# Patient Record
Sex: MALE | Race: BLACK OR AFRICAN AMERICAN | Employment: FULL TIME | URBAN - METROPOLITAN AREA
[De-identification: names, ages, dates, MRNs, and addresses within clinical notes are randomized per-mention and may not be internally consistent; named-entity substitution may affect disease eponyms.]

---

## 2018-03-15 ENCOUNTER — APPOINTMENT (OUTPATIENT)
Dept: GENERAL RADIOLOGY | Age: 58
DRG: 194 | End: 2018-03-15
Attending: PHYSICIAN ASSISTANT
Payer: COMMERCIAL

## 2018-03-15 ENCOUNTER — HOSPITAL ENCOUNTER (INPATIENT)
Age: 58
LOS: 2 days | Discharge: HOME | DRG: 194 | End: 2018-03-17
Attending: EMERGENCY MEDICINE | Admitting: INTERNAL MEDICINE
Payer: COMMERCIAL

## 2018-03-15 ENCOUNTER — APPOINTMENT (OUTPATIENT)
Dept: CT IMAGING | Age: 58
DRG: 194 | End: 2018-03-15
Attending: PHYSICIAN ASSISTANT
Payer: COMMERCIAL

## 2018-03-15 DIAGNOSIS — R42 VERTIGO: ICD-10-CM

## 2018-03-15 DIAGNOSIS — R09.02 HYPOXIA: ICD-10-CM

## 2018-03-15 DIAGNOSIS — J18.9 PNEUMONIA OF LEFT LOWER LOBE DUE TO INFECTIOUS ORGANISM: Primary | ICD-10-CM

## 2018-03-15 PROBLEM — Z78.9 ALCOHOL USE: Status: ACTIVE | Noted: 2018-03-15

## 2018-03-15 PROBLEM — R06.03 ACUTE RESPIRATORY DISTRESS: Status: ACTIVE | Noted: 2018-03-15

## 2018-03-15 PROBLEM — Z72.0 TOBACCO USE: Status: ACTIVE | Noted: 2018-03-15

## 2018-03-15 PROBLEM — M62.82 RHABDOMYOLYSIS: Status: ACTIVE | Noted: 2018-03-15

## 2018-03-15 LAB
ALBUMIN SERPL-MCNC: 3.9 G/DL (ref 3.4–5)
ALBUMIN/GLOB SERPL: 1.1 {RATIO} (ref 0.8–1.7)
ALP SERPL-CCNC: 87 U/L (ref 45–117)
ALT SERPL-CCNC: 33 U/L (ref 16–61)
AMPHET UR QL SCN: NEGATIVE
ANION GAP SERPL CALC-SCNC: 14 MMOL/L (ref 3–18)
APPEARANCE UR: CLEAR
AST SERPL-CCNC: 63 U/L (ref 15–37)
BARBITURATES UR QL SCN: NEGATIVE
BASOPHILS # BLD: 0 K/UL (ref 0–0.1)
BASOPHILS NFR BLD: 0 % (ref 0–3)
BENZODIAZ UR QL: NEGATIVE
BILIRUB SERPL-MCNC: 0.4 MG/DL (ref 0.2–1)
BILIRUB UR QL: NEGATIVE
BUN SERPL-MCNC: 7 MG/DL (ref 7–18)
BUN/CREAT SERPL: 6 (ref 12–20)
CALCIUM SERPL-MCNC: 8.5 MG/DL (ref 8.5–10.1)
CANNABINOIDS UR QL SCN: NEGATIVE
CHLORIDE SERPL-SCNC: 97 MMOL/L (ref 100–108)
CK MB CFR SERPL CALC: 0.1 % (ref 0–4)
CK MB SERPL-MCNC: 1.3 NG/ML (ref 5–25)
CK SERPL-CCNC: 1496 U/L (ref 39–308)
CO2 SERPL-SCNC: 24 MMOL/L (ref 21–32)
COCAINE UR QL SCN: NEGATIVE
COLOR UR: YELLOW
CREAT SERPL-MCNC: 1.12 MG/DL (ref 0.6–1.3)
D DIMER PPP FEU-MCNC: 0.96 UG/ML(FEU)
DIFFERENTIAL METHOD BLD: ABNORMAL
EOSINOPHIL # BLD: 0 K/UL (ref 0–0.4)
EOSINOPHIL NFR BLD: 0 % (ref 0–5)
ERYTHROCYTE [DISTWIDTH] IN BLOOD BY AUTOMATED COUNT: 13.4 % (ref 11.6–14.5)
GLOBULIN SER CALC-MCNC: 3.4 G/DL (ref 2–4)
GLUCOSE SERPL-MCNC: 192 MG/DL (ref 74–99)
GLUCOSE UR STRIP.AUTO-MCNC: >1000 MG/DL
HCT VFR BLD AUTO: 39.5 % (ref 36–48)
HDSCOM,HDSCOM: NORMAL
HGB BLD-MCNC: 13.1 G/DL (ref 13–16)
HGB UR QL STRIP: NEGATIVE
KETONES UR QL STRIP.AUTO: 40 MG/DL
LACTATE SERPL-SCNC: 1.7 MMOL/L (ref 0.4–2)
LEUKOCYTE ESTERASE UR QL STRIP.AUTO: NEGATIVE
LIPASE SERPL-CCNC: 117 U/L (ref 73–393)
LYMPHOCYTES # BLD: 0.5 K/UL (ref 0.8–3.5)
LYMPHOCYTES NFR BLD: 7 % (ref 20–51)
MCH RBC QN AUTO: 28.6 PG (ref 24–34)
MCHC RBC AUTO-ENTMCNC: 33.2 G/DL (ref 31–37)
MCV RBC AUTO: 86.2 FL (ref 74–97)
METHADONE UR QL: NEGATIVE
MONOCYTES # BLD: 0.3 K/UL (ref 0–1)
MONOCYTES NFR BLD: 4 % (ref 2–9)
NEUTS BAND NFR BLD MANUAL: 19 % (ref 0–5)
NEUTS SEG # BLD: 4.9 K/UL (ref 1.8–8)
NEUTS SEG NFR BLD: 70 % (ref 42–75)
NITRITE UR QL STRIP.AUTO: NEGATIVE
OPIATES UR QL: NEGATIVE
PCP UR QL: NEGATIVE
PH UR STRIP: 5.5 [PH] (ref 5–8)
PLATELET # BLD AUTO: 121 K/UL (ref 135–420)
PMV BLD AUTO: 10.4 FL (ref 9.2–11.8)
POTASSIUM SERPL-SCNC: 3.7 MMOL/L (ref 3.5–5.5)
PROT SERPL-MCNC: 7.3 G/DL (ref 6.4–8.2)
PROT UR STRIP-MCNC: NEGATIVE MG/DL
RBC # BLD AUTO: 4.58 M/UL (ref 4.7–5.5)
RBC MORPH BLD: ABNORMAL
SODIUM SERPL-SCNC: 135 MMOL/L (ref 136–145)
SP GR UR REFRACTOMETRY: 1.03 (ref 1–1.03)
TROPONIN I SERPL-MCNC: <0.02 NG/ML (ref 0–0.06)
UROBILINOGEN UR QL STRIP.AUTO: 1 EU/DL (ref 0.2–1)
WBC # BLD AUTO: 7 K/UL (ref 4.6–13.2)

## 2018-03-15 PROCEDURE — 80053 COMPREHEN METABOLIC PANEL: CPT | Performed by: PHYSICIAN ASSISTANT

## 2018-03-15 PROCEDURE — 77030027138 HC INCENT SPIROMETER -A

## 2018-03-15 PROCEDURE — 74011000250 HC RX REV CODE- 250: Performed by: PHYSICIAN ASSISTANT

## 2018-03-15 PROCEDURE — 65270000029 HC RM PRIVATE

## 2018-03-15 PROCEDURE — 94640 AIRWAY INHALATION TREATMENT: CPT

## 2018-03-15 PROCEDURE — 70450 CT HEAD/BRAIN W/O DYE: CPT

## 2018-03-15 PROCEDURE — 83690 ASSAY OF LIPASE: CPT | Performed by: PHYSICIAN ASSISTANT

## 2018-03-15 PROCEDURE — 93005 ELECTROCARDIOGRAM TRACING: CPT

## 2018-03-15 PROCEDURE — 96375 TX/PRO/DX INJ NEW DRUG ADDON: CPT

## 2018-03-15 PROCEDURE — 74011250636 HC RX REV CODE- 250/636: Performed by: INTERNAL MEDICINE

## 2018-03-15 PROCEDURE — 74011636320 HC RX REV CODE- 636/320: Performed by: EMERGENCY MEDICINE

## 2018-03-15 PROCEDURE — 74011000250 HC RX REV CODE- 250: Performed by: INTERNAL MEDICINE

## 2018-03-15 PROCEDURE — 96361 HYDRATE IV INFUSION ADD-ON: CPT

## 2018-03-15 PROCEDURE — 96365 THER/PROPH/DIAG IV INF INIT: CPT

## 2018-03-15 PROCEDURE — 74011250636 HC RX REV CODE- 250/636: Performed by: EMERGENCY MEDICINE

## 2018-03-15 PROCEDURE — 85379 FIBRIN DEGRADATION QUANT: CPT | Performed by: PHYSICIAN ASSISTANT

## 2018-03-15 PROCEDURE — 83605 ASSAY OF LACTIC ACID: CPT | Performed by: PHYSICIAN ASSISTANT

## 2018-03-15 PROCEDURE — 96376 TX/PRO/DX INJ SAME DRUG ADON: CPT

## 2018-03-15 PROCEDURE — 94760 N-INVAS EAR/PLS OXIMETRY 1: CPT

## 2018-03-15 PROCEDURE — 74011250636 HC RX REV CODE- 250/636: Performed by: PHYSICIAN ASSISTANT

## 2018-03-15 PROCEDURE — 74011250637 HC RX REV CODE- 250/637: Performed by: INTERNAL MEDICINE

## 2018-03-15 PROCEDURE — 87040 BLOOD CULTURE FOR BACTERIA: CPT | Performed by: PHYSICIAN ASSISTANT

## 2018-03-15 PROCEDURE — 81003 URINALYSIS AUTO W/O SCOPE: CPT | Performed by: PHYSICIAN ASSISTANT

## 2018-03-15 PROCEDURE — 80307 DRUG TEST PRSMV CHEM ANLYZR: CPT | Performed by: PHYSICIAN ASSISTANT

## 2018-03-15 PROCEDURE — 99285 EMERGENCY DEPT VISIT HI MDM: CPT

## 2018-03-15 PROCEDURE — 77030013140 HC MSK NEB VYRM -A

## 2018-03-15 PROCEDURE — 71045 X-RAY EXAM CHEST 1 VIEW: CPT

## 2018-03-15 PROCEDURE — 85025 COMPLETE CBC W/AUTO DIFF WBC: CPT | Performed by: PHYSICIAN ASSISTANT

## 2018-03-15 PROCEDURE — 71275 CT ANGIOGRAPHY CHEST: CPT

## 2018-03-15 PROCEDURE — 82550 ASSAY OF CK (CPK): CPT | Performed by: PHYSICIAN ASSISTANT

## 2018-03-15 RX ORDER — MECLIZINE HCL 12.5 MG 12.5 MG/1
50 TABLET ORAL
Status: COMPLETED | OUTPATIENT
Start: 2018-03-15 | End: 2018-03-15

## 2018-03-15 RX ORDER — LORAZEPAM 1 MG/1
1 TABLET ORAL
Status: DISCONTINUED | OUTPATIENT
Start: 2018-03-15 | End: 2018-03-18 | Stop reason: HOSPADM

## 2018-03-15 RX ORDER — DIAZEPAM 10 MG/2ML
5 INJECTION INTRAMUSCULAR
Status: DISCONTINUED | OUTPATIENT
Start: 2018-03-15 | End: 2018-03-15

## 2018-03-15 RX ORDER — ONDANSETRON 2 MG/ML
4 INJECTION INTRAMUSCULAR; INTRAVENOUS
Status: COMPLETED | OUTPATIENT
Start: 2018-03-15 | End: 2018-03-15

## 2018-03-15 RX ORDER — SODIUM CHLORIDE 0.9 % (FLUSH) 0.9 %
5-10 SYRINGE (ML) INJECTION AS NEEDED
Status: DISCONTINUED | OUTPATIENT
Start: 2018-03-15 | End: 2018-03-18 | Stop reason: HOSPADM

## 2018-03-15 RX ORDER — ACETAMINOPHEN 325 MG/1
650 TABLET ORAL
Status: DISCONTINUED | OUTPATIENT
Start: 2018-03-15 | End: 2018-03-18 | Stop reason: HOSPADM

## 2018-03-15 RX ORDER — ONDANSETRON 2 MG/ML
4 INJECTION INTRAMUSCULAR; INTRAVENOUS
Status: DISCONTINUED | OUTPATIENT
Start: 2018-03-15 | End: 2018-03-18 | Stop reason: HOSPADM

## 2018-03-15 RX ORDER — LORAZEPAM 2 MG/ML
3 INJECTION INTRAMUSCULAR
Status: DISCONTINUED | OUTPATIENT
Start: 2018-03-15 | End: 2018-03-18 | Stop reason: HOSPADM

## 2018-03-15 RX ORDER — MECLIZINE HCL 12.5 MG 12.5 MG/1
37.5 TABLET ORAL
Status: ACTIVE | OUTPATIENT
Start: 2018-03-15 | End: 2018-03-16

## 2018-03-15 RX ORDER — IPRATROPIUM BROMIDE AND ALBUTEROL SULFATE 2.5; .5 MG/3ML; MG/3ML
3 SOLUTION RESPIRATORY (INHALATION)
Status: COMPLETED | OUTPATIENT
Start: 2018-03-15 | End: 2018-03-15

## 2018-03-15 RX ORDER — ALBUTEROL SULFATE 2.5 MG/.5ML
2.5 SOLUTION RESPIRATORY (INHALATION)
Status: DISPENSED | OUTPATIENT
Start: 2018-03-15 | End: 2018-03-16

## 2018-03-15 RX ORDER — NICOTINE 7MG/24HR
1 PATCH, TRANSDERMAL 24 HOURS TRANSDERMAL EVERY 24 HOURS
Status: DISCONTINUED | OUTPATIENT
Start: 2018-03-15 | End: 2018-03-18 | Stop reason: HOSPADM

## 2018-03-15 RX ORDER — ACETAMINOPHEN 325 MG/1
650 TABLET ORAL
Status: COMPLETED | OUTPATIENT
Start: 2018-03-15 | End: 2018-03-15

## 2018-03-15 RX ORDER — SODIUM CHLORIDE 0.9 % (FLUSH) 0.9 %
5-10 SYRINGE (ML) INJECTION EVERY 8 HOURS
Status: DISCONTINUED | OUTPATIENT
Start: 2018-03-15 | End: 2018-03-18 | Stop reason: HOSPADM

## 2018-03-15 RX ORDER — NALOXONE HYDROCHLORIDE 0.4 MG/ML
0.4 INJECTION, SOLUTION INTRAMUSCULAR; INTRAVENOUS; SUBCUTANEOUS AS NEEDED
Status: DISCONTINUED | OUTPATIENT
Start: 2018-03-15 | End: 2018-03-18 | Stop reason: HOSPADM

## 2018-03-15 RX ORDER — LORAZEPAM 2 MG/ML
2 INJECTION INTRAMUSCULAR
Status: DISCONTINUED | OUTPATIENT
Start: 2018-03-15 | End: 2018-03-18 | Stop reason: HOSPADM

## 2018-03-15 RX ORDER — LORAZEPAM 1 MG/1
2 TABLET ORAL
Status: DISCONTINUED | OUTPATIENT
Start: 2018-03-15 | End: 2018-03-18 | Stop reason: HOSPADM

## 2018-03-15 RX ORDER — LORAZEPAM 2 MG/ML
1 INJECTION INTRAMUSCULAR
Status: DISCONTINUED | OUTPATIENT
Start: 2018-03-15 | End: 2018-03-18 | Stop reason: HOSPADM

## 2018-03-15 RX ORDER — VANCOMYCIN HYDROCHLORIDE 1 G/20ML
1 INJECTION, POWDER, LYOPHILIZED, FOR SOLUTION INTRAVENOUS ONCE
Status: DISCONTINUED | OUTPATIENT
Start: 2018-03-15 | End: 2018-03-15 | Stop reason: SDUPTHER

## 2018-03-15 RX ORDER — HEPARIN SODIUM 5000 [USP'U]/ML
5000 INJECTION, SOLUTION INTRAVENOUS; SUBCUTANEOUS EVERY 8 HOURS
Status: DISCONTINUED | OUTPATIENT
Start: 2018-03-15 | End: 2018-03-18 | Stop reason: HOSPADM

## 2018-03-15 RX ORDER — LEVOFLOXACIN 5 MG/ML
750 INJECTION, SOLUTION INTRAVENOUS
Status: COMPLETED | OUTPATIENT
Start: 2018-03-15 | End: 2018-03-15

## 2018-03-15 RX ORDER — ALBUTEROL SULFATE 2.5 MG/.5ML
5 SOLUTION RESPIRATORY (INHALATION)
Status: COMPLETED | OUTPATIENT
Start: 2018-03-15 | End: 2018-03-15

## 2018-03-15 RX ORDER — LEVOFLOXACIN 5 MG/ML
750 INJECTION, SOLUTION INTRAVENOUS EVERY 24 HOURS
Status: DISCONTINUED | OUTPATIENT
Start: 2018-03-16 | End: 2018-03-16

## 2018-03-15 RX ORDER — MECLIZINE HCL 12.5 MG 12.5 MG/1
50 TABLET ORAL
Status: ACTIVE | OUTPATIENT
Start: 2018-03-15 | End: 2018-03-16

## 2018-03-15 RX ORDER — DOCUSATE SODIUM 100 MG/1
100 CAPSULE, LIQUID FILLED ORAL
Status: DISCONTINUED | OUTPATIENT
Start: 2018-03-15 | End: 2018-03-18 | Stop reason: HOSPADM

## 2018-03-15 RX ORDER — SODIUM CHLORIDE 9 MG/ML
100 INJECTION, SOLUTION INTRAVENOUS CONTINUOUS
Status: DISCONTINUED | OUTPATIENT
Start: 2018-03-15 | End: 2018-03-18 | Stop reason: HOSPADM

## 2018-03-15 RX ORDER — IPRATROPIUM BROMIDE AND ALBUTEROL SULFATE 2.5; .5 MG/3ML; MG/3ML
3 SOLUTION RESPIRATORY (INHALATION)
Status: DISCONTINUED | OUTPATIENT
Start: 2018-03-15 | End: 2018-03-16

## 2018-03-15 RX ORDER — GUAIFENESIN 600 MG/1
600 TABLET, EXTENDED RELEASE ORAL
Status: DISCONTINUED | OUTPATIENT
Start: 2018-03-15 | End: 2018-03-18 | Stop reason: HOSPADM

## 2018-03-15 RX ADMIN — FOLIC ACID: 5 INJECTION, SOLUTION INTRAMUSCULAR; INTRAVENOUS; SUBCUTANEOUS at 22:22

## 2018-03-15 RX ADMIN — IPRATROPIUM BROMIDE AND ALBUTEROL SULFATE 3 ML: .5; 3 SOLUTION RESPIRATORY (INHALATION) at 19:26

## 2018-03-15 RX ADMIN — SODIUM CHLORIDE 1000 MG: 900 INJECTION, SOLUTION INTRAVENOUS at 22:40

## 2018-03-15 RX ADMIN — METHYLPREDNISOLONE SODIUM SUCCINATE 125 MG: 125 INJECTION, POWDER, FOR SOLUTION INTRAMUSCULAR; INTRAVENOUS at 15:58

## 2018-03-15 RX ADMIN — Medication 10 ML: at 22:23

## 2018-03-15 RX ADMIN — IPRATROPIUM BROMIDE AND ALBUTEROL SULFATE 3 ML: .5; 3 SOLUTION RESPIRATORY (INHALATION) at 21:54

## 2018-03-15 RX ADMIN — SODIUM CHLORIDE 1000 ML: 900 INJECTION, SOLUTION INTRAVENOUS at 11:55

## 2018-03-15 RX ADMIN — SODIUM CHLORIDE 100 ML/HR: 900 INJECTION, SOLUTION INTRAVENOUS at 22:23

## 2018-03-15 RX ADMIN — METHYLPREDNISOLONE SODIUM SUCCINATE 40 MG: 40 INJECTION, POWDER, FOR SOLUTION INTRAMUSCULAR; INTRAVENOUS at 22:23

## 2018-03-15 RX ADMIN — IOPAMIDOL 100 ML: 755 INJECTION, SOLUTION INTRAVENOUS at 17:36

## 2018-03-15 RX ADMIN — MECLIZINE 50 MG: 12.5 TABLET ORAL at 14:36

## 2018-03-15 RX ADMIN — ONDANSETRON 4 MG: 2 INJECTION INTRAMUSCULAR; INTRAVENOUS at 15:16

## 2018-03-15 RX ADMIN — ONDANSETRON 4 MG: 2 INJECTION INTRAMUSCULAR; INTRAVENOUS at 12:04

## 2018-03-15 RX ADMIN — HEPARIN SODIUM 5000 UNITS: 5000 INJECTION, SOLUTION INTRAVENOUS; SUBCUTANEOUS at 22:24

## 2018-03-15 RX ADMIN — MECLIZINE 50 MG: 12.5 TABLET ORAL at 15:48

## 2018-03-15 RX ADMIN — LEVOFLOXACIN 750 MG: 5 INJECTION, SOLUTION INTRAVENOUS at 19:27

## 2018-03-15 RX ADMIN — IPRATROPIUM BROMIDE AND ALBUTEROL SULFATE 3 ML: .5; 3 SOLUTION RESPIRATORY (INHALATION) at 14:30

## 2018-03-15 RX ADMIN — ACETAMINOPHEN 650 MG: 325 TABLET, FILM COATED ORAL at 20:47

## 2018-03-15 RX ADMIN — SODIUM CHLORIDE 1000 ML: 900 INJECTION, SOLUTION INTRAVENOUS at 14:36

## 2018-03-15 RX ADMIN — ALBUTEROL SULFATE 5 MG: 2.5 SOLUTION RESPIRATORY (INHALATION) at 15:51

## 2018-03-15 NOTE — IP AVS SNAPSHOT
[address] [address] 
[phone] Patient: Willard Pickens MRN: [id number] IBC:[id number] A check drew indicates which time of day the medication should be taken. My Medications START taking these medications Instructions Each Dose to Equal  
 Morning Noon Evening Bedtime  
 albuterol 90 mcg/actuation inhaler Commonly known as:  PROVENTIL HFA, VENTOLIN HFA, PROAIR HFA Your last dose was: Your next dose is: Take 1 Puff by inhalation every six (6) hours as needed for Wheezing for up to 30 days. 1 Puff  
    
   
   
   
  
 levoFLOXacin 750 mg tablet Commonly known as:  Hiro Pope Your last dose was: Your next dose is: Take 1 Tab by mouth daily for 5 doses. 750 mg  
    
   
   
   
  
 predniSONE 20 mg tablet Commonly known as:  Pop Alfaro Start taking on:  3/18/2018 Your last dose was: Your next dose is: Take 2 Tabs by mouth daily (with breakfast) for 4 days. 40 mg Where to Get Your Medications Information on where to get these meds will be given to you by the nurse or doctor. ! Ask your nurse or doctor about these medications  
  albuterol 90 mcg/actuation inhaler  
 levoFLOXacin 750 mg tablet  
 predniSONE 20 mg tablet

## 2018-03-15 NOTE — ED PROVIDER NOTES
EMERGENCY DEPARTMENT HISTORY AND PHYSICAL EXAM    Date: 3/15/2018  Patient Name: Tiffanie Srinivasan    History of Presenting Illness     Chief Complaint   Patient presents with    Dizziness    Cough         History Provided By: Patient    Chief Complaint: cough  Duration: 1 Days  Timing:  Gradual  Location: chest  Severity: Moderate  Associated Symptoms: chills, CP secondary to cough, congestion, dizziness, vomiting, diarrhea and SOB    Additional History (Context):   11:20 AM  Tiffanie Srinivasan is a 56 y.o. male who presents to the emergency department C/O productive cough with with phlegm onset yesterday. Associated sxs include chills, CP secondary to cough, congestion, dizziness, vomiting, diarrhea and SOB. Pt smoked one pack of cigarettes daily. Pt drinks 6 pack beer alcohol daily. Pt has been around family with similar sxs who were diagnosed with bronchitis and flu. Pt denies any other sxs or complaints. PCP: Rafi Sanford MD        Past History     Past Medical History:  History reviewed. No pertinent past medical history. Past Surgical History:  History reviewed. No pertinent surgical history. Family History:  History reviewed. No pertinent family history. Social History:  Social History   Substance Use Topics    Smoking status: Former Smoker     Packs/day: 0.50     Quit date: 1938    Smokeless tobacco: Never Used    Alcohol use 25.2 oz/week     42 Cans of beer per week      Comment: drinks a 6 pack per day        Allergies:  No Known Allergies      Review of Systems   Review of Systems   Constitutional: Positive for chills. HENT: Positive for congestion. Respiratory: Positive for cough and shortness of breath. Cardiovascular: Positive for chest pain. Gastrointestinal: Positive for diarrhea and vomiting. Neurological: Positive for dizziness. All other systems reviewed and are negative.       Physical Exam     Vitals:    03/15/18 1845 03/15/18 1900 03/15/18 1915 03/15/18 1930   BP: 133/80 120/89 129/72 117/73   Pulse: (!) 101 99 96 (!) 105   Resp: 25 26 26 17   Temp:       SpO2: 95% 95% 91% 99%   Weight:       Height:         Physical Exam   Constitutional: He is oriented to person, place, and time. Thin male slightly pale appearing reclined on stretcher with eyes closed, appears comfortable & in NAD   HENT:   Head: Normocephalic and atraumatic. Eyes: Conjunctivae and EOM are normal. Pupils are equal, round, and reactive to light. Neck: Normal range of motion. Neck supple. Cardiovascular: Normal rate and regular rhythm. Pulmonary/Chest: Effort normal. He has wheezes. Abdominal: Soft. Bowel sounds are normal. He exhibits no distension. There is no tenderness. There is no rebound and no guarding. Musculoskeletal: Normal range of motion. He exhibits no edema or tenderness. Neurological: He is alert and oriented to person, place, and time. He has normal strength. No cranial nerve deficit or sensory deficit. Gait normal. GCS eye subscore is 4. GCS verbal subscore is 5. GCS motor subscore is 6. Skin: Skin is warm and dry. Psychiatric: He has a normal mood and affect. Nursing note and vitals reviewed.         Diagnostic Study Results     Labs -     Recent Results (from the past 12 hour(s))   EKG, 12 LEAD, INITIAL    Collection Time: 03/15/18 11:06 AM   Result Value Ref Range    Ventricular Rate 82 BPM    Atrial Rate 82 BPM    P-R Interval 124 ms    QRS Duration 88 ms    Q-T Interval 376 ms    QTC Calculation (Bezet) 439 ms    Calculated P Axis 84 degrees    Calculated R Axis -64 degrees    Calculated T Axis 75 degrees    Diagnosis       Normal sinus rhythm with sinus arrhythmia  Right atrial enlargement  Pulmonary disease pattern  Left anterior fascicular block  Abnormal ECG  No previous ECGs available     CBC WITH AUTOMATED DIFF    Collection Time: 03/15/18 11:12 AM   Result Value Ref Range    WBC 7.0 4.6 - 13.2 K/uL    RBC 4.58 (L) 4.70 - 5.50 M/uL    HGB 13.1 13.0 - 16.0 g/dL    HCT 39.5 36.0 - 48.0 %    MCV 86.2 74.0 - 97.0 FL    MCH 28.6 24.0 - 34.0 PG    MCHC 33.2 31.0 - 37.0 g/dL    RDW 13.4 11.6 - 14.5 %    PLATELET 417 (L) 113 - 420 K/uL    MPV 10.4 9.2 - 11.8 FL    NEUTROPHILS 70 42 - 75 %    BAND NEUTROPHILS 19 (H) 0 - 5 %    LYMPHOCYTES 7 (L) 20 - 51 %    MONOCYTES 4 2 - 9 %    EOSINOPHILS 0 0 - 5 %    BASOPHILS 0 0 - 3 %    ABS. NEUTROPHILS 4.9 1.8 - 8.0 K/UL    ABS. LYMPHOCYTES 0.5 (L) 0.8 - 3.5 K/UL    ABS. MONOCYTES 0.3 0 - 1.0 K/UL    ABS. EOSINOPHILS 0.0 0.0 - 0.4 K/UL    ABS. BASOPHILS 0.0 0.0 - 0.1 K/UL    RBC COMMENTS NORMOCYTIC, NORMOCHROMIC      DF MANUAL     METABOLIC PANEL, COMPREHENSIVE    Collection Time: 03/15/18 11:12 AM   Result Value Ref Range    Sodium 135 (L) 136 - 145 mmol/L    Potassium 3.7 3.5 - 5.5 mmol/L    Chloride 97 (L) 100 - 108 mmol/L    CO2 24 21 - 32 mmol/L    Anion gap 14 3.0 - 18 mmol/L    Glucose 192 (H) 74 - 99 mg/dL    BUN 7 7.0 - 18 MG/DL    Creatinine 1.12 0.6 - 1.3 MG/DL    BUN/Creatinine ratio 6 (L) 12 - 20      GFR est AA >60 >60 ml/min/1.73m2    GFR est non-AA >60 >60 ml/min/1.73m2    Calcium 8.5 8.5 - 10.1 MG/DL    Bilirubin, total 0.4 0.2 - 1.0 MG/DL    ALT (SGPT) 33 16 - 61 U/L    AST (SGOT) 63 (H) 15 - 37 U/L    Alk.  phosphatase 87 45 - 117 U/L    Protein, total 7.3 6.4 - 8.2 g/dL    Albumin 3.9 3.4 - 5.0 g/dL    Globulin 3.4 2.0 - 4.0 g/dL    A-G Ratio 1.1 0.8 - 1.7     CARDIAC PANEL,(CK, CKMB & TROPONIN)    Collection Time: 03/15/18 11:12 AM   Result Value Ref Range    CK 1496 (H) 39 - 308 U/L    CK - MB 1.3 <3.6 ng/ml    CK-MB Index 0.1 0.0 - 4.0 %    Troponin-I, Qt. <0.02 0.00 - 0.06 NG/ML   LIPASE    Collection Time: 03/15/18 11:12 AM   Result Value Ref Range    Lipase 117 73 - 393 U/L   URINALYSIS W/ RFLX MICROSCOPIC    Collection Time: 03/15/18 12:00 PM   Result Value Ref Range    Color YELLOW      Appearance CLEAR      Specific gravity 1.026 1.005 - 1.030      pH (UA) 5.5 5.0 - 8.0      Protein NEGATIVE  NEG mg/dL    Glucose >1000 (A) NEG mg/dL    Ketone 40 (A) NEG mg/dL    Bilirubin NEGATIVE  NEG      Blood NEGATIVE  NEG      Urobilinogen 1.0 0.2 - 1.0 EU/dL    Nitrites NEGATIVE  NEG      Leukocyte Esterase NEGATIVE  NEG     DRUG SCREEN, URINE    Collection Time: 03/15/18 12:00 PM   Result Value Ref Range    BENZODIAZEPINES NEGATIVE  NEG      BARBITURATES NEGATIVE  NEG      THC (TH-CANNABINOL) NEGATIVE  NEG      OPIATES NEGATIVE  NEG      PCP(PHENCYCLIDINE) NEGATIVE  NEG      COCAINE NEGATIVE  NEG      AMPHETAMINES NEGATIVE  NEG      METHADONE NEGATIVE  NEG      HDSCOM (NOTE)    D DIMER    Collection Time: 03/15/18  3:35 PM   Result Value Ref Range    D DIMER 0.96 (H) <0.46 ug/ml(FEU)       Radiologic Studies -   CTA CHEST W OR W WO CONT   Final Result   1. Suboptimal contrast opacification, although no definite evidence of  pulmonary embolism.     2.  Multifocal patchy areas of airspace filling bilateral lungs most pronounced  left lower lobe and middle lobe, most suggestive of multifocal pneumonia. As read by the radiologist.       CT Results  (Last 48 hours)               03/15/18 1305  CT HEAD WO CONT Final result    Impression:  IMPRESSION:           1. No acute intracranial abnormality. Narrative:  EXAM: CT head       INDICATION: Headache and dizziness       COMPARISON: None. TECHNIQUE: Axial CT imaging of the head was performed without intravenous   contrast.       One or more dose reduction techniques were used on this CT: automated exposure   control, adjustment of the mAs and/or kVp according to patient's size, and   iterative reconstruction techniques. The specific techniques utilized on this CT   exam have been documented in the patient's electronic medical   record._______________       FINDINGS:       BRAIN AND POSTERIOR FOSSA: Cortical sulci volume is within normal limits for   patient age. The ventricular size and configuration is within normal limits. Basilar cisterns are patent. There is no intracranial hemorrhage, mass effect,   or midline shift. Gray-white matter differentiation is within normal limits. EXTRA-AXIAL SPACES AND MENINGES: No acute extra-axial fluid collection. Small,   punctate foci of tentorial calcification noted bilaterally. CALVARIUM: No acute osseous abnormality       SINUSES: Imaged paranasal sinuses and mastoid air cells are clear. OTHER: Faint atherosclerotic calcification of the carotid siphons is noted. _______________    As read by the radiologist.            CXR Results  (Last 48 hours)               03/15/18 1213  XR CHEST PORT Final result    Impression:  IMPRESSION:       No acute pulmonary process identified. Narrative:  EXAM: One-view chest       CLINICAL HISTORY: cough ,       COMPARISON: None       FINDINGS:       Frontal view of the chest demonstrate clear lungs. Cardiac silhouette is normal   in size and contour. No acute bony or soft tissue abnormality.     As read by the radiologist.              Medications given in the ED-  Medications   meclizine (ANTIVERT) tablet 37.5 mg (0 mg Oral Held 3/15/18 1441)   meclizine (ANTIVERT) tablet 50 mg ( Oral Canceled Entry 3/15/18 1445)   albuterol CONCENTRATE 2.5mg/0.5 mL neb soln (not administered)   levoFLOXacin (LEVAQUIN) 750 mg in D5W IVPB (750 mg IntraVENous New Bag 3/15/18 1927)   sodium chloride (NS) flush 5-10 mL (not administered)   sodium chloride 0.9 % bolus infusion 1,000 mL (0 mL IntraVENous IV Completed 3/15/18 1230)   ondansetron (ZOFRAN) injection 4 mg (4 mg IntraVENous Given 3/15/18 1204)   meclizine (ANTIVERT) tablet 50 mg (50 mg Oral Given 3/15/18 1436)   albuterol-ipratropium (DUO-NEB) 2.5 MG-0.5 MG/3 ML (3 mL Nebulization Given 3/15/18 1430)   sodium chloride 0.9 % bolus infusion 1,000 mL (0 mL IntraVENous IV Completed 3/15/18 1508)   ondansetron (ZOFRAN) injection 4 mg (4 mg IntraVENous Given 3/15/18 1516)   meclizine (ANTIVERT) tablet 50 mg (50 mg Oral Given 3/15/18 1548)   albuterol CONCENTRATE 2.5mg/0.5 mL neb soln (5 mg Nebulization Given 3/15/18 1551)   methylPREDNISolone (PF) (SOLU-MEDROL) injection 125 mg (125 mg IntraVENous Given 3/15/18 1558)   iopamidol (ISOVUE-370) 76 % injection 100 mL (100 mL IntraVENous Given 3/15/18 1736)   albuterol-ipratropium (DUO-NEB) 2.5 MG-0.5 MG/3 ML (3 mL Nebulization Given 3/15/18 1926)         Medical Decision Making   I am the first provider for this patient. I reviewed the vital signs, available nursing notes, past medical history, past surgical history, family history and social history. Vital Signs-Reviewed the patient's vital signs. Pulse Oximetry Analysis - 98% on RA     Cardiac Monitor:  Rate: 82 bpm  Rhythm: NSR    EKG interpretation: (Preliminary)  11:06 AM   Rate 82 bpm. NSR with sinus arrhythmia. Right atrial enlargement. EKG read by Maria Del Carmen Bosch PA-C at 11:06 AM     Records Reviewed: Nursing Notes    Procedures:  Procedures    ED Course:   11:20 AM Initial assessment performed. The patients presenting problems have been discussed, and they are in agreement with the care plan formulated and outlined with them. I have encouraged them to ask questions as they arise throughout their visit. 11:48 AM Approached by nurse pt is not orthostatic however during standing portion his dizziness got worse and pt threw up. Zofran and fluids ordered, will consider meclizine. 3:10 PM Pt continuing to vomit, PO challenge unsuccessful. Pt is still dizzy. Will consult with  for face to face evaluation. 3:17 PM Discussed patient's history, exam, and available diagnostics results with Sarah Emerson DO, ED attending, who agree with seeing pt. He recommends Zofran and valium IV since pt cannot tolerate meclozine. FACE-TO-FACE PROGRESS NOTE:  3:36 PM  Was requested to see pt by the JESSE. Evaluated pt face-to-face. Pt resting. Family at bedside. In no distress. Oxygen is 93% ORA.  He has a few scattered wheezes and some coughing at the bedside. States he just coughed some white sputum. Neuro exam is grossly intact. Will now give Meclizine since pt now has had Zofran and nausea is now improved. Will give Albuterol tx. Will have MLP reassess pt and have him ambulate. If abnormal gait or worsening dizziness, will get MRI brain. DDx: Benign vs. Central vertigo. Likely has bronchitis. Did order a D Dimer. If elevated will get CTA for r/u PE. Written by Elfego Evangelista, ED Scribe, as dictated by Rg Velasquez DO.      6:48 PM Discussed patient's CT scan (evidance of PNA) with Rg Velasquez DO, ED attending. 6:56 PM Pt is now tachycardic, remains afebrile. Will initiate sepsis bundle, draw blood cultures and lactic acid. Based on pt's weight sepsis bundle called for 1632 mL normal saline bolus. Pt has already received 2L fluid in ED. Will give Levaquin and call for admission. Pts pulse ox 93% on RA. He does have slight wheezing, will order another duo neb. His dizziness and nausea has completely resolved, exam is consistent with vertigo. SEPSIS ASSESSMENT NOTE:   7:33 PM  Lino Zambrano PA-C has seen and assessed the patient as follows:    Capillary Refill:normal/brisk  Cardiopulmonary Evaluation:   Lung Sounds: wheezing   Cardiac Sounds: regular  Peripheral Pulses: present  Skin Exam: skin unremarkable, warm, turgor good    Visit Vitals    /73    Pulse (!) 105    Temp 97.6 °F (36.4 °C)    Resp 17    Ht 5' 8\" (1.727 m)    Wt 54.4 kg (120 lb)    SpO2 99%    BMI 18.25 kg/m2       Written by Adriano Collazo ED Scribe, as dictated by Lino Zambrano PA-C    7:41 PM Discussed patient's history, exam, and available diagnostics results with Lashanda Benavides MD, hospitalist, who agree with admitting to remote tele. Diagnosis and Disposition     Critical Care Time: 7:45 PM  I have spent 50 minutes of critical care time involved in lab review, consultations with specialist, family decision-making, and documentation.   During this entire length of time I was immediately available to the patient. Critical Care: The reason for providing this level of medical care for this critically ill patient was due a critical illness that impaired one or more vital organ systems such that there was a high probability of imminent or life threatening deterioration in the patients condition. This care involved high complexity decision making to assess, manipulate, and support vital system functions, to treat this degreee vital organ system failure and to prevent further life threatening deterioration of the patients condition. Core Measures:  For Hospitalized Patients:    1. Hospitalization Decision Time:  The decision to hospitalize the patient was made by Melissa Velasquez PA-C at 6:30 PM on 3/15/2018    2. Aspirin: Aspirin was not given because the patient did not present with a stroke at the time of their Emergency Department evaluation    7:41 PM Patient is being admitted to the hospital by Melanie Snow MD. The results of their tests and reasons for their admission have been discussed with them and/or available family. They convey agreement and understanding for the need to be admitted and for their admission diagnosis. CONDITIONS ON ADMISSION:  Sepsis is not present at the time of admission. Deep Vein Thrombosis is not present at the time of admission. Thrombosis is not present at the time of admission. Urinary Tract Infection is not present at the time of admission. Pneumonia is present at the time of admission. MRSA is not present at the time of admission. Wound infection is not present at the time of admission. Pressure Ulcer is not present at the time of admission. CLINICAL IMPRESSION:    1. Pneumonia of left lower lobe due to infectious organism ([address]    2. Vertigo    3. Hypoxia      Discussion: 56 y.o male present to ER complaining of 24 hours of cough, congestion, positional dizziness with nausea and vomiting.  Pt has been reluctant to see PCP, has had routine visits in past and has no documented health problems. FHx significant for CAD, HTN and liver disease. Pt arrived via EMS, ambulatory to room. He was afebrile, not hypoxic. Story sounded like bronchitis with vertigo component. While in ED pt received neb treatment. Attempted meclozine for vertigo however pt vomited and was unable to tolerate PO. Pt had 2L fluids, Zofran and eventually has PO meclozine. Labs did not have significant findings, specifically no leukocytosis and CXR without PNA. He continues to wheeze so d-dimer was added, which was elevated. CT chest was ordered and negative for PE however does show multifocal PNA. Antibiotic and sepsis protocol was initiated. Pt has remained afebrile however oxygen saturation have been hovering around 92% on RA.  has evaluated pt and agrees with admission to hospitalist service for PNA. Lactic acid pending at this time however fluid bolus of 2L has been completed and antibiotics have been initiated.   _______________________________    Attestations: This note is prepared by Gayathri Rodríguez , acting as Scribe for Lino Zambrano PA-C. Lino Zambrano PA-C:  The scribe's documentation has been prepared under my direction and personally reviewed by me in its entirety. I confirm that the note above accurately reflects all work, treatment, procedures, and medical decision making performed by me. This note is prepared by Jonnathan Hutchison, acting as Scribe for [employer], DO. [employer], DO:  The scribe's documentation has been prepared under my direction and personally reviewed by me in its entirety.   I confirm that the note above accurately reflects all work, treatment, procedures, and medical decision making performed by me.  _______________________________

## 2018-03-15 NOTE — ED NOTES
Family updated by CATALINA White, CT + for Pneum, not captured on Chest XR. Initiating  Sepsis Bundle and protocol. S/p 2L IVF completed, ongoing VS obtianed, within defined limits except HR Tachycarding . Plan: for admision.

## 2018-03-15 NOTE — IP AVS SNAPSHOT
[address] [address] 
[phone] Patient: Mey Moreira MRN: [id number] GDV:[id number] About your hospitalization You were admitted on:  March 15, 2018 You last received care in the:  [address] [place] [address] You were discharged on:  March 17, 2018 Why you were hospitalized Your primary diagnosis was:  Pneumonia Your diagnoses also included:  Acute Respiratory Distress, Hypoxia, Tobacco Use, Alcohol Use, Rhabdomyolysis, Pna (Pneumonia) Follow-up Information Follow up With Details Comments Contact Info Rafi Sanford MD   Patient can only remember the practice name and not the physician Rafi Sanford MD In 2 weeks  Patient can only remember the practice name and not the physician Discharge Orders None A check drew indicates which time of day the medication should be taken. My Medications START taking these medications Instructions Each Dose to Equal  
 Morning Noon Evening Bedtime  
 albuterol 90 mcg/actuation inhaler Commonly known as:  PROVENTIL HFA, VENTOLIN HFA, PROAIR HFA Your last dose was: Your next dose is: Take 1 Puff by inhalation every six (6) hours as needed for Wheezing for up to 30 days. 1 Puff  
    
   
   
   
  
 levoFLOXacin 750 mg tablet Commonly known as:  Jonathan Trejo Your last dose was: Your next dose is: Take 1 Tab by mouth daily for 5 doses. 750 mg  
    
   
   
   
  
 predniSONE 20 mg tablet Commonly known as:  Neelima Dhillon Start taking on:  3/18/2018 Your last dose was: Your next dose is: Take 2 Tabs by mouth daily (with breakfast) for 4 days. 40 mg Where to Get Your Medications Information on where to get these meds will be given to you by the nurse or doctor. ! Ask your nurse or doctor about these medications  
  albuterol 90 mcg/actuation inhaler  
 levoFLOXacin 750 mg tablet  
 predniSONE 20 mg tablet Discharge Instructions DISCHARGE SUMMARY from Nurse PATIENT INSTRUCTIONS: 
 
 
F-face looks uneven A-arms unable to move or move unevenly S-speech slurred or non-existent T-time-call 911 as soon as signs and symptoms begin-DO NOT go Back to bed or wait to see if you get better-TIME IS BRAIN. Warning Signs of HEART ATTACK Call 911 if you have these symptoms: 
? Chest discomfort. Most heart attacks involve discomfort in the center of the chest that lasts more than a few minutes, or that goes away and comes back. It can feel like uncomfortable pressure, squeezing, fullness, or pain. ? Discomfort in other areas of the upper body. Symptoms can include pain or discomfort in one or both arms, the back, neck, jaw, or stomach. ? Shortness of breath with or without chest discomfort. ? Other signs may include breaking out in a cold sweat, nausea, or lightheadedness. Don't wait more than five minutes to call [address]! Fast action can save your life. Calling 911 is almost always the fastest way to get lifesaving treatment. Emergency Medical Services staff can begin treatment when they arrive  up to an hour sooner than if someone gets to the hospital by car. The discharge information has been reviewed with the patient. The patient verbalized understanding. Discharge medications reviewed with the patient and appropriate educational materials and side effects teaching were provided. ___________________________________________________________________________________________________________________________________ [employer] Announcement We are excited to announce that we are making your provider's discharge notes available to you in [employer]. You will see these notes when they are completed and signed by the physician that discharged you from your recent hospital stay. If you have any questions or concerns about any information you see in [employer], please call the Health Information Department where you were seen or reach out to your Primary Care Provider for more information about your plan of care. Introducing hospitals & HEALTH SERVICES! Ludmila Davis introduces [employer] patient portal. Now you can access parts of your medical record, email your doctor's office, and request medication refills online. 1. In your internet browser, go to [email or web address] 2. Click on the First Time User? Click Here link in the Sign In box. You will see the New Member Sign Up page. 3. Enter your [employer] Access Code exactly as it appears below. You will not need to use this code after youve completed the sign-up process. If you do not sign up before the expiration date, you must request a new code. · [employer] Access Code: [email or web address] Expires: 6/13/2018  1:35 PM 
 
4. Enter the last four digits of your Social Security Number (xxxx) and Date of Birth (mm/dd/yyyy) as indicated and click Submit. You will be taken to the next sign-up page. 5. Create a [employer] ID. This will be your [employer] login ID and cannot be changed, so think of one that is secure and easy to remember. 6. Create a [employer] password. You can change your password at any time. 7. Enter your Password Reset Question and Answer. This can be used at a later time if you forget your password. 8. Enter your e-mail address. You will receive e-mail notification when new information is available in [address] 9. Click Sign Up. You can now view and download portions of your medical record. 10. Click the Download Summary menu link to download a portable copy of your medical information. If you have questions, please visit the Frequently Asked Questions section of the [employer] website. Remember, [employer] is NOT to be used for urgent needs. For medical emergencies, dial 911. Now available from your iPhone and Android! Unresulted Labs-Please follow up with your PCP about these lab tests Order Current Status CULTURE, BLOOD Preliminary result CULTURE, BLOOD Preliminary result EKG, 12 LEAD, INITIAL Preliminary result Providers Seen During Your Hospitalization Provider Specialty Primary office phone Fam Price DO Emergency Medicine [phone] [place] Officer, MD Internal Medicine [phone] Your Primary Care Physician (PCP) Primary Care Physician Office Phone Office Fax OTHER, PHYS ** None ** ** None ** You are allergic to the following No active allergies Recent Documentation Height Weight BMI Smoking Status 1.727 m 54 kg 18.09 kg/m2 Former Smoker Emergency Contacts Name Discharge Info Relation Home Work Mobile [address] CAREGIVER [3] Other Relative [6] [phone] Patient Belongings The following personal items are in your possession at time of discharge: 
  Dental Appliances: Partials, Uppers  Visual Aid: None      Home Medications: None   Jewelry: None  Clothing: None    Other Valuables: None Please provide this summary of care documentation to your next provider. Signatures-by signing, you are acknowledging that this After Visit Summary has been reviewed with you and you have received a copy. Patient Signature:  ____________________________________________________________ Date:  ____________________________________________________________  
  
Najma Malone  Provider Signature: ____________________________________________________________ Date:  ____________________________________________________________

## 2018-03-15 NOTE — IP AVS SNAPSHOT
Summary of Care Report The Summary of Care report has been created to help improve care coordination. Users with access to [employer] or [employer] [address] (Web-based application) may access additional patient information including the Discharge Summary. If you are not currently a [address] user and need more information, please call the number listed below in the [place] [address] section and ask to be connected with Medical Records. Facility Information Name Address Phone [place] [phone] Patient Information Patient Name Sex  Jase Buenrostro ([id number]) Male 1960 Discharge Information Admitting Provider Service Area Unit Matilda Rivers MD / [address] Surg/Onco / [phone] Discharge Provider Discharge Date/Time Discharge Disposition Destination (none) 3/17/2018 (Pending) AHR (none) Patient Language Language ENGLISH [13] Hospital Problems as of 3/17/2018  Reviewed: 3/15/2018  9:05 PM by Matilda Rivers MD  
  
  
  
 Class Noted - Resolved Last Modified POA Active Problems * (Principal)Pneumonia  3/15/2018 - Present 3/15/2018 by Matilda Rivers MD Unknown Entered by CATALINA Da Silva Acute respiratory distress  3/15/2018 - Present 3/15/2018 by Matilda Rivers MD Unknown Entered by Matilda Rivers MD  
  Hypoxia  3/15/2018 - Present 3/15/2018 by Matilda Rivers MD Unknown Entered by Matilda Rivers MD  
  Tobacco use  3/15/2018 - Present 3/15/2018 by Matilda Rivers MD Unknown Entered by Matilda Rivers MD  
  Alcohol use  3/15/2018 - Present 3/15/2018 by Matilda Rivers MD Unknown Entered by Matilda Rivers MD  
  Rhabdomyolysis  3/15/2018 - Present 3/15/2018 by Matilda Rivers MD Unknown   Entered by Matilda Rivers MD  
 PNA (pneumonia)  3/15/2018 - Present 3/15/2018 by Nataliya Simmons MD Unknown Entered by Nataliya Simmons MD  
  
Non-Hospital Problems as of 3/17/2018  Reviewed: 3/15/2018  9:05 PM by Nataliya Simmons MD  
 None You are allergic to the following No active allergies Current Discharge Medication List  
  
START taking these medications Dose & Instructions Dispensing Information Comments  
 albuterol 90 mcg/actuation inhaler Commonly known as:  PROVENTIL HFA, VENTOLIN HFA, PROAIR HFA Dose:  1 Puff Take 1 Puff by inhalation every six (6) hours as needed for Wheezing for up to 30 days. Quantity:  1 Inhaler Refills:  0  
   
 levoFLOXacin 750 mg tablet Commonly known as:  Kennth Paradise Dose:  750 mg Take 1 Tab by mouth daily for 5 doses. Quantity:  5 Tab Refills:  0  
   
 predniSONE 20 mg tablet Commonly known as:  Latrelle Braulio Start taking on:  3/18/2018 Dose:  40 mg Take 2 Tabs by mouth daily (with breakfast) for 4 days. Quantity:  8 Tab Refills:  0 Follow-up Information Follow up With Details Comments Contact Info Rafi Sanford MD   Patient can only remember the practice name and not the physician Rafi Sanford MD In 2 weeks  Patient can only remember the practice name and not the physician Discharge Instructions DISCHARGE SUMMARY from Nurse PATIENT INSTRUCTIONS: 
 
 
F-face looks uneven A-arms unable to move or move unevenly S-speech slurred or non-existent T-time-call 911 as soon as signs and symptoms begin-DO NOT go Back to bed or wait to see if you get better-TIME IS BRAIN. Warning Signs of HEART ATTACK Call 911 if you have these symptoms: ? Chest discomfort. Most heart attacks involve discomfort in the center of the chest that lasts more than a few minutes, or that goes away and comes back. It can feel like uncomfortable pressure, squeezing, fullness, or pain. ? Discomfort in other areas of the upper body. Symptoms can include pain or discomfort in one or both arms, the back, neck, jaw, or stomach. ? Shortness of breath with or without chest discomfort. ? Other signs may include breaking out in a cold sweat, nausea, or lightheadedness. Don't wait more than five minutes to call [address]! Fast action can save your life. Calling 911 is almost always the fastest way to get lifesaving treatment. Emergency Medical Services staff can begin treatment when they arrive  up to an hour sooner than if someone gets to the hospital by car. The discharge information has been reviewed with the patient. The patient verbalized understanding. Discharge medications reviewed with the patient and appropriate educational materials and side effects teaching were provided. ___________________________________________________________________________________________________________________________________ Chart Review Routing History No Routing History on File

## 2018-03-15 NOTE — Clinical Note
Status[de-identified] Inpatient [101] Type of Bed: Remote Telemetry [29] Inpatient Hospitalization Certified Necessary for the Following Reasons: 3. Patient receiving treatment that can only be provided in an inpatient setting (further clarification in H&P documentation) Admitting Diagnosis: Pneumonia [527880] Admitting Physician: Ashok Deng [[id number]] Attending Physician: Ashok Deng [[id number]] Estimated Length of Stay: 2 Midnights Discharge Plan[de-identified] Home with Office Follow-up

## 2018-03-15 NOTE — ED NOTES
Discussion with Adult Sister of patient, Family medical hx re: CAD, HTN and Liver CA. Updated PA. Pending results of tests  Recommended, labs, CXR, CT/Head. IVF infusing, Zofran IVP administered pending results.

## 2018-03-16 LAB
ALBUMIN SERPL-MCNC: 3.3 G/DL (ref 3.4–5)
ALBUMIN/GLOB SERPL: 1 {RATIO} (ref 0.8–1.7)
ALP SERPL-CCNC: 73 U/L (ref 45–117)
ALT SERPL-CCNC: 29 U/L (ref 16–61)
ANION GAP SERPL CALC-SCNC: 12 MMOL/L (ref 3–18)
AST SERPL-CCNC: 61 U/L (ref 15–37)
BILIRUB SERPL-MCNC: 0.3 MG/DL (ref 0.2–1)
BUN SERPL-MCNC: 7 MG/DL (ref 7–18)
BUN/CREAT SERPL: 5 (ref 12–20)
CALCIUM SERPL-MCNC: 8.1 MG/DL (ref 8.5–10.1)
CHLORIDE SERPL-SCNC: 102 MMOL/L (ref 100–108)
CK SERPL-CCNC: 1437 U/L (ref 39–308)
CO2 SERPL-SCNC: 25 MMOL/L (ref 21–32)
CREAT SERPL-MCNC: 1.35 MG/DL (ref 0.6–1.3)
ERYTHROCYTE [DISTWIDTH] IN BLOOD BY AUTOMATED COUNT: 13.4 % (ref 11.6–14.5)
GLOBULIN SER CALC-MCNC: 3.4 G/DL (ref 2–4)
GLUCOSE BLD STRIP.AUTO-MCNC: 120 MG/DL (ref 70–110)
GLUCOSE SERPL-MCNC: 157 MG/DL (ref 74–99)
HCT VFR BLD AUTO: 39.2 % (ref 36–48)
HGB BLD-MCNC: 12.8 G/DL (ref 13–16)
L PNEUMO AG UR QL IA: NEGATIVE
MCH RBC QN AUTO: 27.7 PG (ref 24–34)
MCHC RBC AUTO-ENTMCNC: 32.7 G/DL (ref 31–37)
MCV RBC AUTO: 84.8 FL (ref 74–97)
PLATELET # BLD AUTO: 131 K/UL (ref 135–420)
PMV BLD AUTO: 10.2 FL (ref 9.2–11.8)
POTASSIUM SERPL-SCNC: 3.1 MMOL/L (ref 3.5–5.5)
PROT SERPL-MCNC: 6.7 G/DL (ref 6.4–8.2)
RBC # BLD AUTO: 4.62 M/UL (ref 4.7–5.5)
S PNEUM AG UR QL: NEGATIVE
SODIUM SERPL-SCNC: 139 MMOL/L (ref 136–145)
WBC # BLD AUTO: 7.1 K/UL (ref 4.6–13.2)

## 2018-03-16 PROCEDURE — 94640 AIRWAY INHALATION TREATMENT: CPT

## 2018-03-16 PROCEDURE — 82962 GLUCOSE BLOOD TEST: CPT

## 2018-03-16 PROCEDURE — 36415 COLL VENOUS BLD VENIPUNCTURE: CPT | Performed by: INTERNAL MEDICINE

## 2018-03-16 PROCEDURE — 85027 COMPLETE CBC AUTOMATED: CPT | Performed by: INTERNAL MEDICINE

## 2018-03-16 PROCEDURE — 74011000250 HC RX REV CODE- 250: Performed by: INTERNAL MEDICINE

## 2018-03-16 PROCEDURE — 74011250636 HC RX REV CODE- 250/636: Performed by: INTERNAL MEDICINE

## 2018-03-16 PROCEDURE — 87449 NOS EACH ORGANISM AG IA: CPT | Performed by: HOSPITALIST

## 2018-03-16 PROCEDURE — 94760 N-INVAS EAR/PLS OXIMETRY 1: CPT

## 2018-03-16 PROCEDURE — 65270000029 HC RM PRIVATE

## 2018-03-16 PROCEDURE — 74011250637 HC RX REV CODE- 250/637: Performed by: INTERNAL MEDICINE

## 2018-03-16 PROCEDURE — 87450 LEGIONELLA PNEUMOPHILA AG, URINE: CPT | Performed by: HOSPITALIST

## 2018-03-16 PROCEDURE — 82550 ASSAY OF CK (CPK): CPT | Performed by: INTERNAL MEDICINE

## 2018-03-16 PROCEDURE — 80053 COMPREHEN METABOLIC PANEL: CPT | Performed by: INTERNAL MEDICINE

## 2018-03-16 RX ORDER — LEVOFLOXACIN 5 MG/ML
750 INJECTION, SOLUTION INTRAVENOUS
Status: DISCONTINUED | OUTPATIENT
Start: 2018-03-16 | End: 2018-03-17

## 2018-03-16 RX ORDER — INSULIN LISPRO 100 [IU]/ML
INJECTION, SOLUTION INTRAVENOUS; SUBCUTANEOUS
Status: DISCONTINUED | OUTPATIENT
Start: 2018-03-16 | End: 2018-03-17

## 2018-03-16 RX ORDER — IPRATROPIUM BROMIDE AND ALBUTEROL SULFATE 2.5; .5 MG/3ML; MG/3ML
3 SOLUTION RESPIRATORY (INHALATION)
Status: DISCONTINUED | OUTPATIENT
Start: 2018-03-16 | End: 2018-03-18 | Stop reason: HOSPADM

## 2018-03-16 RX ORDER — ASPIRIN 325 MG/1
100 TABLET, FILM COATED ORAL DAILY
Status: DISCONTINUED | OUTPATIENT
Start: 2018-03-17 | End: 2018-03-18 | Stop reason: HOSPADM

## 2018-03-16 RX ORDER — VANCOMYCIN/0.9 % SOD CHLORIDE 750 MG/250
750 PLASTIC BAG, INJECTION (ML) INTRAVENOUS EVERY 12 HOURS
Status: DISCONTINUED | OUTPATIENT
Start: 2018-03-16 | End: 2018-03-17

## 2018-03-16 RX ADMIN — METHYLPREDNISOLONE SODIUM SUCCINATE 40 MG: 40 INJECTION, POWDER, FOR SOLUTION INTRAMUSCULAR; INTRAVENOUS at 09:54

## 2018-03-16 RX ADMIN — HEPARIN SODIUM 5000 UNITS: 5000 INJECTION, SOLUTION INTRAVENOUS; SUBCUTANEOUS at 13:58

## 2018-03-16 RX ADMIN — IPRATROPIUM BROMIDE AND ALBUTEROL SULFATE 3 ML: .5; 3 SOLUTION RESPIRATORY (INHALATION) at 07:10

## 2018-03-16 RX ADMIN — ACETAMINOPHEN 650 MG: 325 TABLET ORAL at 14:06

## 2018-03-16 RX ADMIN — HEPARIN SODIUM 5000 UNITS: 5000 INJECTION, SOLUTION INTRAVENOUS; SUBCUTANEOUS at 05:22

## 2018-03-16 RX ADMIN — LEVOFLOXACIN 750 MG: 5 INJECTION, SOLUTION INTRAVENOUS at 17:40

## 2018-03-16 RX ADMIN — IPRATROPIUM BROMIDE AND ALBUTEROL SULFATE 3 ML: .5; 3 SOLUTION RESPIRATORY (INHALATION) at 02:40

## 2018-03-16 RX ADMIN — Medication 750 MG: at 10:02

## 2018-03-16 RX ADMIN — SODIUM CHLORIDE 100 ML/HR: 900 INJECTION, SOLUTION INTRAVENOUS at 17:58

## 2018-03-16 NOTE — PROGRESS NOTES
Pharmacy Dosing Services: Vancomycin    Consult for Vancomycin Dosing by Pharmacy by Dr. Arielle De La Torre provided for this 56y.o. year old male , for indication of Pneumonia (CAP/HAP). Day of Therapy 1    Ht Readings from Last 1 Encounters:   03/15/18 172.7 cm (68\")        Wt Readings from Last 1 Encounters:   03/15/18 51.8 kg (114 lb 4.8 oz)     Previous Regimen NA   Last Level NA   Other Current Antibiotics Levaquin   Significant Cultures Pending   Serum Creatinine Lab Results   Component Value Date/Time    Creatinine 1.12 03/15/2018 11:12 AM      Creatinine Clearance Estimated Creatinine Clearance: 54.6 mL/min (based on Cr of 1.12). BUN Lab Results   Component Value Date/Time    BUN 7 03/15/2018 11:12 AM      WBC Lab Results   Component Value Date/Time    WBC 7.0 03/15/2018 11:12 AM      H/H Lab Results   Component Value Date/Time    HGB 13.1 03/15/2018 11:12 AM      Platelets Lab Results   Component Value Date/Time    PLATELET 396 (L) 82/08/6388 11:12 AM      Temp 98.1 °F (36.7 °C)     Start Vancomycin therapy, with loading dose of 1000 mg at 2200 3/15/18. Follow with maintenance dose of 750 mg at 10:00 3/16/18, every 12 hours. Dose calculated to approximate a therapeutic trough of 15-20 mcg/mL. Pharmacy to follow daily and will make changes to dose and/or frequency based on clinical status.     Pharmacist [address] [facility] [address] Leonidas Colon

## 2018-03-16 NOTE — PROGRESS NOTES
INITIAL NUTRITION ASSESSMENT     RECOMMENDATIONS/PLAN:   Add MVI  Add Thiamine   Add Ensure Enlive TID  Monitor labs/lytes, PO intakes, skin integrity, wt, fluid status, BM  Adult Malnutrition Criteria:      Nutrition assessment was completed by RDN and the patient was found to meet the following malnutrition criteria established by ASPEN/AND:    Adult Malnutrition Guidelines:  MALNUTRITION OF MODERATE DEGREE - NON-SEVERE MALNUTRITION IN THE CONTEXT OF CHRONIC ILLNESS  Body Fat: Mild depletion  Muscle Mass: Mild depletion    Jazz Springer  03/16/18    REASON FOR ASSESSMENT:     [] Positive Nutrition Screen:  [x] BMI <18.5    NUTRITION ASSESSMENT:   Client History: 56 yrs old Male admitted with PNA, acute resp failure w/ hypoxia. Noted hx of tobacco and ETOH use      PMHx: no pertinent PMH  Cultural/Orthodoxy Food Preferences: None Identified    FOOD/NUTRITION HISTORY  Diet History: pt reports eating very well at home (steaks, chicken, etc.) pt only had poor appetite for one day. Pt does report taking protein drinks at home. Food Allergies:  [x] NKFA     Pertinent PTA Medications: none on file     NUTRITION INTAKE   Diet Order:  Regular      Average PO Intake:       No data found. Pertinent Medications:  [x] Reviewed; colace, heparin, methylprednisolone,   Electrolyte Replacement Protocol: []K  []Mg  []PO4    Insulin:  [] SSI  [] Pre-meal   []  Basal   [] Drip  [] None  Pt expected to meet estimated nutrient needs through next review:          [x]  Yes     [] No;  ANTHROPOMETRICS  Height: 5' 8\" (172.7 cm)       Weight: 51.8 kg (114 lb 4.8 oz)    BMI: 17.4 kg/m^2  -  underweight (Less than or = to 18.5% BMI)        Weight change: pt reports # and that he has always been about the same size an unable to gain wt. Pt has no calf or quad muscle tone, clavicle is showing.  Due to low BMI and physical findings would suggest moderate protein malnutrition; pt has very good appetite, so do not believe pt is not eating concerned with ETOH abuse hx                                  Comparison to Reference Standards:  IBW: 154 lbs      %IBW: 74%      AdjBW: n/a    NUTRITION-FOCUSED PHYSICAL ASSESSMENT  Skin: No PU. GI: No BM    BIOCHEMICAL DATA & MEDICAL TESTS  Pertinent Labs:  [x] Reviewed K-3.1 Glucose-157     NUTRITION PRESCRIPTION  Calories: 9672-9512 kcal/day based on 30-35kcal/kg  Protein: 52-78 g/day based on 1.0-1.5 g/kg  Fluid: 8469-9658 ml/day based on 1 kcal/ml      NUTRITION DIAGNOSES:   1. At risk for inadequate oral intake related to low BMI as evidence by BMI of 17.4 despite pt having a good appetite and taking protein supplements at home. NUTRITION INTERVENTIONS:   INTERVENTIONS:        GOALS:  1. MVI and Thiamine 1. Encourage PO intake >50% at all meals by next review 3 days   2. Commercial Beverage-Ensure Enlive TID          LEARNING NEEDS (Diet, Supplementation, Food/Nutrient-Drug Interaction):   [x] None Identified  [] Inpatient education provided/documented    [] Identified and patient:  [] Declined     [] Was not appropriate/indicated  NUTRITION MONITORING /EVALUATION:   Follow PO intake  Monitor wt  Monitor renal labs, electrolytes, fluid status  Monitor for additional supplement needs    [] Participated in Interdisciplinary Rounds  [x] Interdisciplinary Care Plan Reviewed/Documented  DISCHARGE NUTRITION RECOMMENDATIONS ADDRESSED:     [x] Yes- recommended Regular diet     NUTRITION RISK:     [x]  At risk                     []  Not currently at risk     Will follow-up per policy.   Rosetta Romero, [id number] [address]

## 2018-03-16 NOTE — PROGRESS NOTES
Hospitalist Progress Note    Patient: Nevin Hutchins MRN: [id number]  CSN: [id number]    YOB: 1960  Age: 56 y.o. Sex: male    DOA: 3/15/2018 LOS:  LOS: 1 day                Assessment/Plan     Patient Active Problem List   Diagnosis Code    Pneumonia J18.9    Acute respiratory distress R06.03    Hypoxia R09.02    Tobacco use Z72.0    Alcohol use Z78.9    Rhabdomyolysis M62.82    PNA (pneumonia) J18.9            57 yo male admitted for respiratory distress. Acute respiratory distress - secondary to pneumonia, +/- COPD. Multifocal pneumonia - on vancomycin, levaquin. Check resp cultures, urine strep pneumo and legionella. Possible undiagnosed COPD - on solumedrol and duo nebs. Wean steroids and switch to oral prednisone. Rhabdomyolysis - continue with IVF, follow CK. Tobacco use - on nicotine patch    Alcohol use - on CIWA protocol and banana bag. DVT prophylaxis      Disposition : 1-2 days    Review of systems  General: No fevers or chills. Cardiovascular: No chest pain or pressure. No palpitations. Pulmonary: No shortness of breath. Gastrointestinal: No nausea, vomiting. Physical Exam:  General: Awake, cooperative, no acute distress    HEENT: NC, Atraumatic. PERRLA, anicteric sclerae. Lungs: Scattered coarse breath sounds and rhonchi bilaterally. Heart:  Regular  rhythm,  No murmur, No Rubs, No Gallops  Abdomen: Soft, Non distended, Non tender.  +Bowel sounds,   Extremities: No c/c/e  Psych:   Not anxious or agitated. Neurologic:  No acute neurological deficit. Vital signs/Intake and Output:  Visit Vitals    /75    Pulse 97    Temp 99.7 °F (37.6 °C)    Resp 18    Ht 5' 8\" (1.727 m)    Wt 51.8 kg (114 lb 4.8 oz)    SpO2 96%    BMI 17.38 kg/m2     Current Shift:  03/16 0701 - 03/16 1900  In: 56 [P.O.:240;  I.V.:250]  Out: -   Last three shifts:  03/14 1901 - 03/16 0700  In: 1031.7 [I.V.:1031.7]  Out: 475 [Urine:475]            Labs: Results:       Chemistry Recent Labs      03/16/18   0414  03/15/18   1112   GLU  157*  192*   NA  139  135*   K  3.1*  3.7   CL  102  97*   CO2  25  24   BUN  7  7   CREA  1.35*  1.12   CA  8.1*  8.5   AGAP  12  14   BUCR  5*  6*   AP  73  87   TP  6.7  7.3   ALB  3.3*  3.9   GLOB  3.4  3.4   AGRAT  1.0  1.1      CBC w/Diff Recent Labs      03/16/18   0414  03/15/18   1112   WBC  7.1  7.0   RBC  4.62*  4.58*   HGB  12.8*  13.1   HCT  39.2  39.5   PLT  131*  121*   GRANS   --   70   LYMPH   --   7*   EOS   --   0      Cardiac Enzymes Recent Labs      03/16/18   0414  03/15/18   1112   CPK  1437*  1496*   CKND1   --   0.1      Coagulation No results for input(s): PTP, INR, APTT in the last 72 hours. No lab exists for component: INREXT    Lipid Panel No results found for: CHOL, CHOLPOCT, CHOLX, CHLST, CHOLV, 978269, HDL, LDL, LDLC, DLDLP, 127808, VLDLC, VLDL, TGLX, TRIGL, TRIGP, TGLPOCT, CHHD, CHHDX   BNP No results for input(s): BNPP in the last 72 hours.    Liver Enzymes Recent Labs      03/16/18   0414   TP  6.7   ALB  3.3*   AP  73   SGOT  61*      Thyroid Studies No results found for: T4, T3U, TSH, TSHEXT     Procedures/imaging: see electronic medical records for all procedures/Xrays and details which were not copied into this note but were reviewed prior to creation of Plan

## 2018-03-16 NOTE — H&P
History & Physical    Patient: Willard Pickens MRN: [id number]  CSN: [id number]    YOB: 1960  Age: 56 y.o. Sex: male      DOA: 3/15/2018  Primary Care Provider:  Rafi Sanford MD      Assessment/Plan     Patient Active Problem List   Diagnosis Code    Pneumonia J18.9    Acute respiratory distress R06.03    Hypoxia R09.02    Tobacco use Z72.0    Alcohol use Z78.9    Rhabdomyolysis M62.82    PNA (pneumonia) J18.9       1. Acute respiratory distress with Hypoxia on RA with sat of 87%. Possible undiagnosed acute moderate COPD exac with hypoxia  2. CAP, multifocal   3. Mild Rhabdomyolysis   4. Tobacco Use, 4 cig/ day   5. Alcohol Use, about 6 pk beers per day   6. Dizzines, likely from mild-mod dehydration - improved  Full Code     -admit for further care   -cultures ordered. Broad spectrum Abx. CTA chest neg for PE but shows multifocal PNA  -deescalate Abx as warranted   -IV solumedrol, duo nebs, mucinex prn   -IVF, follow up Ck levels in am  -will need outpatient PFTs once breathing has improved in likely 4 weeks   -nicotine patch, CIWA protocol  -supportive care   -counseld to quit alcohol and tobacco     Estimated length of stay : 2-3 days     CC: sob       HPI:     Willard Pickens is a 56 y.o. male who comes to the ed with complaints of sob. Patient states for the past 3 days he has noticed increase in SOB and subjective fevers. He doesn't know what his temp was since his sister took it yesterday but states it was high. He works at [employer] with likely sick people exposure, smokes 3-4 cig/ per day >30 yrs and drinks 6pk beer per day >10 yrs. But what made him come today is because he felt very light headed this morning and dizzy. Admit of poor po intake during this time. Doesn't take any meds at home. No known medical hx. In the ED he was noted to be hypoxic with sat mid 80s on RA especially with ambulation. CTA was done as her D dimer was elevated.  CTA showed multifocal pna. ABx were started. Labs also showed elevated Ck. On exam he was having exp wheezing along with mild corase BS. After getting IVF, his dizziness had resolved     History reviewed. No pertinent past medical history. History reviewed. No pertinent surgical history. History reviewed. No pertinent family history. Social History     Social History    Marital status: SINGLE     Spouse name: N/A    Number of children: N/A    Years of education: N/A     Social History Main Topics    Smoking status: Former Smoker     Packs/day: 0.50     Quit date:     Smokeless tobacco: Never Used    Alcohol use 25.2 oz/week     42 Cans of beer per week      Comment: drinks a 6 pack per day     Drug use: No    Sexual activity: Not Asked     Other Topics Concern    None     Social History Narrative    None       Prior to Admission medications    Not on File       No Known Allergies    Review of Systems  Gen: No fever, chills, malaise, weight loss/gain. Heent: No headache, rhinorrhea, epistaxis, ear pain, hearing loss, sinus pain, neck pain/stiffness, sore throat. Heart: No chest pain, palpitations, MILTON, pnd, or orthopnea. Resp: No  hemoptysis, wheezing   GI: No nausea, vomiting, diarrhea, constipation, melena or hematochezia. : No urinary obstruction, dysuria or hematuria. Derm: No rash, new skin lesion or pruritis. Musc/skeletal: no bone or joint complains. Vasc: No edema, cyanosis or claudication. Endo: No heat/cold intolerance, no polyuria,polydipsia or polyphagia. Neuro: No unilateral weakness, numbness, tingling. No seizures. Heme: No easy bruising or bleeding.           Physical Exam:     Physical Exam:  Visit Vitals    /81    Pulse (!) 102    Temp 100.4 °F (38 °C)    Resp 26    Ht 5' 8\" (1.727 m)    Wt 54.4 kg (120 lb)    SpO2 95%    BMI 18.25 kg/m2      O2 Device: Room air    Temp (24hrs), Av.8 °F (37.1 °C), Min:97.6 °F (36.4 °C), Max:100.4 °F (38 °C)         0701 - 03/15 1900  In: -   Out: 475 [Urine:475]    General:  Awake, cooperative, no distress. Head:  Normocephalic, without obvious abnormality, atraumatic. Eyes:  Conjunctivae/corneas clear, sclera anicteric, PERRL, EOMs intact. Nose: Nares normal. No drainage or sinus tenderness. Throat: Lips, mucosa, and tongue normal.    Neck: Supple, symmetrical, trachea midline, no adenopathy. Lungs:   Mild exp wheezing with diffuse coarse BS       Heart:  Regular rate and rhythm, S1, S2 normal, no murmur, click, rub or gallop. Abdomen: Soft, non-tender. Bowel sounds normal. No masses,  No organomegaly. Extremities: Extremities normal, atraumatic, no cyanosis or edema. Capillary refill normal.   Pulses: 2+ and symmetric all extremities. Skin: Skin color pink/pale/mottled/flushed, turgor normal. No rashes or lesions   Neurologic: CNII-XII intact. No focal motor or sensory deficit.        Labs Reviewed:    Recent Results (from the past 24 hour(s))   EKG, 12 LEAD, INITIAL    Collection Time: 03/15/18 11:06 AM   Result Value Ref Range    Ventricular Rate 82 BPM    Atrial Rate 82 BPM    P-R Interval 124 ms    QRS Duration 88 ms    Q-T Interval 376 ms    QTC Calculation (Bezet) 439 ms    Calculated P Axis 84 degrees    Calculated R Axis -64 degrees    Calculated T Axis 75 degrees    Diagnosis       Normal sinus rhythm with sinus arrhythmia  Right atrial enlargement  Pulmonary disease pattern  Left anterior fascicular block  Abnormal ECG  No previous ECGs available     CBC WITH AUTOMATED DIFF    Collection Time: 03/15/18 11:12 AM   Result Value Ref Range    WBC 7.0 4.6 - 13.2 K/uL    RBC 4.58 (L) 4.70 - 5.50 M/uL    HGB 13.1 13.0 - 16.0 g/dL    HCT 39.5 36.0 - 48.0 %    MCV 86.2 74.0 - 97.0 FL    MCH 28.6 24.0 - 34.0 PG    MCHC 33.2 31.0 - 37.0 g/dL    RDW 13.4 11.6 - 14.5 %    PLATELET 492 (L) 524 - 420 K/uL    MPV 10.4 9.2 - 11.8 FL    NEUTROPHILS 70 42 - 75 %    BAND NEUTROPHILS 19 (H) 0 - 5 %    LYMPHOCYTES 7 (L) 20 - 51 %    MONOCYTES 4 2 - 9 %    EOSINOPHILS 0 0 - 5 %    BASOPHILS 0 0 - 3 %    ABS. NEUTROPHILS 4.9 1.8 - 8.0 K/UL    ABS. LYMPHOCYTES 0.5 (L) 0.8 - 3.5 K/UL    ABS. MONOCYTES 0.3 0 - 1.0 K/UL    ABS. EOSINOPHILS 0.0 0.0 - 0.4 K/UL    ABS. BASOPHILS 0.0 0.0 - 0.1 K/UL    RBC COMMENTS NORMOCYTIC, NORMOCHROMIC      DF MANUAL     METABOLIC PANEL, COMPREHENSIVE    Collection Time: 03/15/18 11:12 AM   Result Value Ref Range    Sodium 135 (L) 136 - 145 mmol/L    Potassium 3.7 3.5 - 5.5 mmol/L    Chloride 97 (L) 100 - 108 mmol/L    CO2 24 21 - 32 mmol/L    Anion gap 14 3.0 - 18 mmol/L    Glucose 192 (H) 74 - 99 mg/dL    BUN 7 7.0 - 18 MG/DL    Creatinine 1.12 0.6 - 1.3 MG/DL    BUN/Creatinine ratio 6 (L) 12 - 20      GFR est AA >60 >60 ml/min/1.73m2    GFR est non-AA >60 >60 ml/min/1.73m2    Calcium 8.5 8.5 - 10.1 MG/DL    Bilirubin, total 0.4 0.2 - 1.0 MG/DL    ALT (SGPT) 33 16 - 61 U/L    AST (SGOT) 63 (H) 15 - 37 U/L    Alk.  phosphatase 87 45 - 117 U/L    Protein, total 7.3 6.4 - 8.2 g/dL    Albumin 3.9 3.4 - 5.0 g/dL    Globulin 3.4 2.0 - 4.0 g/dL    A-G Ratio 1.1 0.8 - 1.7     CARDIAC PANEL,(CK, CKMB & TROPONIN)    Collection Time: 03/15/18 11:12 AM   Result Value Ref Range    CK 1496 (H) 39 - 308 U/L    CK - MB 1.3 <3.6 ng/ml    CK-MB Index 0.1 0.0 - 4.0 %    Troponin-I, Qt. <0.02 0.00 - 0.06 NG/ML   LIPASE    Collection Time: 03/15/18 11:12 AM   Result Value Ref Range    Lipase 117 73 - 393 U/L   URINALYSIS W/ RFLX MICROSCOPIC    Collection Time: 03/15/18 12:00 PM   Result Value Ref Range    Color YELLOW      Appearance CLEAR      Specific gravity 1.026 1.005 - 1.030      pH (UA) 5.5 5.0 - 8.0      Protein NEGATIVE  NEG mg/dL    Glucose >1000 (A) NEG mg/dL    Ketone 40 (A) NEG mg/dL    Bilirubin NEGATIVE  NEG      Blood NEGATIVE  NEG      Urobilinogen 1.0 0.2 - 1.0 EU/dL    Nitrites NEGATIVE  NEG      Leukocyte Esterase NEGATIVE  NEG     DRUG SCREEN, URINE    Collection Time: 03/15/18 12:00 PM   Result Value Ref Range    BENZODIAZEPINES NEGATIVE  NEG      BARBITURATES NEGATIVE  NEG      THC (TH-CANNABINOL) NEGATIVE  NEG      OPIATES NEGATIVE  NEG      PCP(PHENCYCLIDINE) NEGATIVE  NEG      COCAINE NEGATIVE  NEG      AMPHETAMINES NEGATIVE  NEG      METHADONE NEGATIVE  NEG      HDSCOM (NOTE)    D DIMER    Collection Time: 03/15/18  3:35 PM   Result Value Ref Range    D DIMER 0.96 (H) <0.46 ug/ml(FEU)   LACTIC ACID    Collection Time: 03/15/18  7:10 PM   Result Value Ref Range    Lactic acid 1.7 0.4 - 2.0 MMOL/L       Procedures/imaging: see electronic medical records for all procedures/Xrays and details which were not copied into this note but were reviewed prior to creation of Plan    50 minutes of  care time spent in the direct evaluation and treatment of this high risk patient.      CC: Rafi Sanford MD

## 2018-03-16 NOTE — PROGRESS NOTES
Pharmacy Dosing Services:   Pharmacist Renal Dosing Progress Note for Levofloxacin  Physician: Nessa Lockett    The following medication: Levofloxacin was automatically dose-adjusted per THE [facility] P&T Committee Protocol, with respect to renal function. Consult provided for this   56 y.o. , male , for the indication of CAP/HAP. Pt Weight:   Wt Readings from Last 1 Encounters:   03/15/18 51.8 kg (114 lb 4.8 oz)         Previous Regimen  750 mg IV every 24 hours   Serum Creatinine Lab Results   Component Value Date/Time    Creatinine 1.35 (H) 03/16/2018 04:14 AM       Creatinine Clearance Estimated Creatinine Clearance: 45.3 mL/min (based on Cr of 1.35). BUN Lab Results   Component Value Date/Time    BUN 7 03/16/2018 04:14 AM       Dosage changed to:  750 mg load, then 750 mg IV every 48 hours    Pharmacy to continue to monitor patient daily. Will make dosage adjustments based upon changing renal function.   Signed Ana Birch PHARMD. Contact information: [phone]

## 2018-03-16 NOTE — PROGRESS NOTES
Met with pt and multiple family members at bedside. Pt lives with his sister and family will assist as needed. Pt is independent. Please encourage ambulation. No plan of care needs have been identified at this time. CM remains available.

## 2018-03-16 NOTE — PROGRESS NOTES
Chart reviewed. Pt admitted to hospital for pneumonia. CM will follow for discharge planning needs. Readmission Risk Assessment: Low Risk and [facility] patients    RRAT Score: 1 - 12    Initial Assessment:  Per chart, pt is a 56 y.o. male who comes to the ed with complaints of sob. Patient states for the past 3 days he has noticed increase in SOB and subjective fevers. He doesn't know what his temp was since his sister took it yesterday but states it was high. He works at [employer] with likely sick people exposure, smokes 3-4 cig/ per day >30 yrs and drinks 6pk beer per day >10 yrs. But what made him come today is because he felt very light headed this morning and dizzy. Admit of poor po intake during this time. Doesn't take any meds at home. No known medical hx. In the ED he was noted to be hypoxic with sat mid 80s on RA especially with ambulation. Emergency Contact:   Name Relation Home Work [address] [place] [address] Rd Other Relative [phone]       Pertinent Medical Hx: ETOH use, Tobacco use,     PCP/Specialists:     Community Services:     DME:     [facility] Transition Plan:  1. Evaluate for [facility] or [address] coordination of resources  2. Involve patient/caregiver in assessment, planning, education and implement of intervention. 3. CM daily patient care huddles/interdisciplinary rounds. 4. PCP/Specialist appointment within 7 - 10 days made prior to discharge. 5. Facilitate transportation and logistics for follow-up appointments.   6. Handoff to [address] Nurse Navigator or PCP practice    Care Management Interventions  Transition of Care Consult (CM Consult): Discharge Planning

## 2018-03-16 NOTE — ED NOTES
Primary Nurse César Sanchez and Jaime Adler , RN performed a dual skin assessment on this patient No impairment noted  Justin score is 23

## 2018-03-16 NOTE — ED NOTES
Pt stable. Pt alert and awake. Speaking with Dr. Jarad Magana at this time. PT breathing with ease on room air. VS stable on cardiac monitor. Antibiotics infusing as ordered via right arm PIV with no signs of infiltration. Continue to monitor. Maintain safety precautions.

## 2018-03-16 NOTE — PROGRESS NOTES
Primary Nurse Jayne Clay RN and Charanjit Mcdonald RN performed a dual skin assessment on this patient No impairment noted  Justin score is 21

## 2018-03-16 NOTE — PROGRESS NOTES
Bedside and Verbal shift change report given to TERRELL Drake RN (oncoming nurse) by Jose Miguel Harvey RN (offgoing nurse). Report included the following information SBAR and Kardex.      Patient Vitals for the past 12 hrs:   Temp Pulse Resp BP SpO2   03/16/18 1902 98.7 °F (37.1 °C) 97 18 130/84 99 %   03/16/18 1546 99.1 °F (37.3 °C) 95 18 135/76 99 %   03/16/18 1212 99.6 °F (37.6 °C) 100 18 137/87 98 %   03/16/18 0748 99.7 °F (37.6 °C) 97 18 113/75 96 %

## 2018-03-17 VITALS
WEIGHT: 119 LBS | TEMPERATURE: 99.6 F | HEIGHT: 68 IN | OXYGEN SATURATION: 95 % | HEART RATE: 87 BPM | RESPIRATION RATE: 18 BRPM | SYSTOLIC BLOOD PRESSURE: 133 MMHG | DIASTOLIC BLOOD PRESSURE: 75 MMHG | BODY MASS INDEX: 18.04 KG/M2

## 2018-03-17 LAB
ALBUMIN SERPL-MCNC: 2.8 G/DL (ref 3.4–5)
ALBUMIN/GLOB SERPL: 0.9 {RATIO} (ref 0.8–1.7)
ALP SERPL-CCNC: 63 U/L (ref 45–117)
ALT SERPL-CCNC: 28 U/L (ref 16–61)
ANION GAP SERPL CALC-SCNC: 7 MMOL/L (ref 3–18)
AST SERPL-CCNC: 63 U/L (ref 15–37)
BILIRUB SERPL-MCNC: 0.2 MG/DL (ref 0.2–1)
BUN SERPL-MCNC: 10 MG/DL (ref 7–18)
BUN/CREAT SERPL: 10 (ref 12–20)
CALCIUM SERPL-MCNC: 8.3 MG/DL (ref 8.5–10.1)
CHLORIDE SERPL-SCNC: 104 MMOL/L (ref 100–108)
CK SERPL-CCNC: 1255 U/L (ref 39–308)
CO2 SERPL-SCNC: 28 MMOL/L (ref 21–32)
CREAT SERPL-MCNC: 0.97 MG/DL (ref 0.6–1.3)
GLOBULIN SER CALC-MCNC: 3.1 G/DL (ref 2–4)
GLUCOSE BLD STRIP.AUTO-MCNC: 124 MG/DL (ref 70–110)
GLUCOSE BLD STRIP.AUTO-MCNC: 154 MG/DL (ref 70–110)
GLUCOSE BLD STRIP.AUTO-MCNC: 192 MG/DL (ref 70–110)
GLUCOSE SERPL-MCNC: 126 MG/DL (ref 74–99)
POTASSIUM SERPL-SCNC: 4.1 MMOL/L (ref 3.5–5.5)
PROT SERPL-MCNC: 5.9 G/DL (ref 6.4–8.2)
SODIUM SERPL-SCNC: 139 MMOL/L (ref 136–145)

## 2018-03-17 PROCEDURE — 80053 COMPREHEN METABOLIC PANEL: CPT | Performed by: INTERNAL MEDICINE

## 2018-03-17 PROCEDURE — 74011250637 HC RX REV CODE- 250/637: Performed by: INTERNAL MEDICINE

## 2018-03-17 PROCEDURE — 36415 COLL VENOUS BLD VENIPUNCTURE: CPT | Performed by: INTERNAL MEDICINE

## 2018-03-17 PROCEDURE — 82962 GLUCOSE BLOOD TEST: CPT

## 2018-03-17 PROCEDURE — 74011250636 HC RX REV CODE- 250/636: Performed by: INTERNAL MEDICINE

## 2018-03-17 PROCEDURE — 74011636637 HC RX REV CODE- 636/637: Performed by: INTERNAL MEDICINE

## 2018-03-17 PROCEDURE — 82550 ASSAY OF CK (CPK): CPT | Performed by: INTERNAL MEDICINE

## 2018-03-17 RX ORDER — PREDNISONE 20 MG/1
40 TABLET ORAL
Status: DISCONTINUED | OUTPATIENT
Start: 2018-03-17 | End: 2018-03-18 | Stop reason: HOSPADM

## 2018-03-17 RX ORDER — PREDNISONE 20 MG/1
40 TABLET ORAL
Qty: 4 TAB | Refills: 0 | Status: SHIPPED | OUTPATIENT
Start: 2018-03-18 | End: 2018-03-17

## 2018-03-17 RX ORDER — LEVOFLOXACIN 750 MG/1
750 TABLET ORAL DAILY
Qty: 5 TAB | Refills: 0 | Status: SHIPPED | OUTPATIENT
Start: 2018-03-17 | End: 2018-03-22

## 2018-03-17 RX ORDER — ALBUTEROL SULFATE 90 UG/1
1 AEROSOL, METERED RESPIRATORY (INHALATION)
Qty: 1 INHALER | Refills: 0 | Status: SHIPPED | OUTPATIENT
Start: 2018-03-17 | End: 2018-04-16

## 2018-03-17 RX ORDER — LEVOFLOXACIN 5 MG/ML
750 INJECTION, SOLUTION INTRAVENOUS EVERY 24 HOURS
Status: DISCONTINUED | OUTPATIENT
Start: 2018-03-17 | End: 2018-03-18 | Stop reason: HOSPADM

## 2018-03-17 RX ORDER — PREDNISONE 20 MG/1
40 TABLET ORAL
Qty: 8 TAB | Refills: 0 | Status: SHIPPED | OUTPATIENT
Start: 2018-03-18 | End: 2018-03-22

## 2018-03-17 RX ADMIN — Medication 750 MG: at 00:23

## 2018-03-17 RX ADMIN — Medication 10 ML: at 00:24

## 2018-03-17 RX ADMIN — PREDNISONE 40 MG: 20 TABLET ORAL at 08:39

## 2018-03-17 RX ADMIN — HEPARIN SODIUM 5000 UNITS: 5000 INJECTION, SOLUTION INTRAVENOUS; SUBCUTANEOUS at 17:21

## 2018-03-17 RX ADMIN — Medication 10 ML: at 13:51

## 2018-03-17 RX ADMIN — MULTIPLE VITAMINS W/ MINERALS TAB 1 TABLET: TAB at 08:39

## 2018-03-17 RX ADMIN — SODIUM CHLORIDE 100 ML/HR: 900 INJECTION, SOLUTION INTRAVENOUS at 00:23

## 2018-03-17 RX ADMIN — METHYLPREDNISOLONE SODIUM SUCCINATE 40 MG: 40 INJECTION, POWDER, FOR SOLUTION INTRAMUSCULAR; INTRAVENOUS at 00:24

## 2018-03-17 RX ADMIN — LEVOFLOXACIN 750 MG: 5 INJECTION, SOLUTION INTRAVENOUS at 17:21

## 2018-03-17 RX ADMIN — HEPARIN SODIUM 5000 UNITS: 5000 INJECTION, SOLUTION INTRAVENOUS; SUBCUTANEOUS at 08:39

## 2018-03-17 RX ADMIN — Medication 100 MG: at 08:39

## 2018-03-17 RX ADMIN — ACETAMINOPHEN 650 MG: 325 TABLET ORAL at 10:24

## 2018-03-17 RX ADMIN — HEPARIN SODIUM 5000 UNITS: 5000 INJECTION, SOLUTION INTRAVENOUS; SUBCUTANEOUS at 00:24

## 2018-03-17 RX ADMIN — SODIUM CHLORIDE 100 ML/HR: 900 INJECTION, SOLUTION INTRAVENOUS at 13:53

## 2018-03-17 NOTE — PROGRESS NOTES
Problem: Falls - Risk of  Goal: *Absence of Falls  Document Sj Fall Risk and appropriate interventions in the flowsheet.    Outcome: Progressing Towards Goal  Fall Risk Interventions:            Medication Interventions: Evaluate medications/consider consulting pharmacy, Patient to call before getting OOB, Teach patient to arise slowly

## 2018-03-17 NOTE — PROGRESS NOTES
Pharmacy Dosing Services:  Pharmacist Renal Dosing Progress Note for Levolfoxacin  Physician: Sandra Lorenzana  The following medication: Levofloxacin was automatically dose-adjusted per THE [facility] P&T Committee Protocol, with respect to renal function. Consult provided for this   56 y.o. , male , for the indication of CAP/HAP. Pt Weight:   Wt Readings from Last 1 Encounters:   03/16/18 54 kg (119 lb)         Previous Regimen  750 mg IV every 48 hours   Serum Creatinine Lab Results   Component Value Date/Time    Creatinine 0.97 03/17/2018 05:25 AM       Creatinine Clearance Estimated Creatinine Clearance: 65.7 mL/min (based on Cr of 0.97). BUN Lab Results   Component Value Date/Time    BUN 10 03/17/2018 05:25 AM       Dosage changed to:  750 mg IV every 24 hours    Pharmacy to continue to monitor patient daily. Will make dosage adjustments based upon changing renal function.   Signed ANIKA GraceD. Contact information: [phone]

## 2018-03-17 NOTE — PROGRESS NOTES
1000 patient ambulated in the hallway on room air with continuous pulse oximeter, oxygen saturation at 91- 94% on room air , pulse is 104-110. No complained of chest pain, shortness of breath or difficulty breathing. Bedside and Verbal shift change report given to [address] [place] [address] (oncoming nurse) by Ted Vallecillo RN (offgoing nurse). Report included the following information SBAR and Kardex.      Patient Vitals for the past 12 hrs:   Temp Pulse Resp BP SpO2   03/17/18 1259 99.2 °F (37.3 °C) 97 18 132/85 99 %   03/17/18 0815 99.1 °F (37.3 °C) 93 18 131/82 90 %

## 2018-03-17 NOTE — PROGRESS NOTES
Hospitalist Progress Note    Patient: Margarita Eye MRN: [id number]  CSN: [id number]    YOB: 1960  Age: 56 y.o. Sex: male    DOA: 3/15/2018 LOS:  LOS: 2 days          Assessment/Plan     Disposition :  Patient Active Problem List   Diagnosis Code    Pneumonia J18.9    Acute respiratory distress R06.03    Hypoxia R09.02    Tobacco use Z72.0    Alcohol use Z78.9    Rhabdomyolysis M62.82    PNA (pneumonia) J18.9     1. Acute respiratory distress with Hypoxia on RA with sat of 87%. Possible undiagnosed acute moderate COPD exac with hypoxia - improved  2. CAP, multifocal   3. Mild Rhabdomyolysis; improving  4. Tobacco Use, 4 cig/ day   5. Alcohol Use, about 6 pk beers per day   6. Dizzines, likely from mild-mod dehydration - improved  Full Code      -Broad spectrum Abx. CTA chest neg for PE but shows multifocal PNA  -deescalate Abx as warranted - discontinue Vanc today   -IV solumedrol swtiched to po Prednisone for 5 days , duo nebs, mucinex prn   -IVF, follow up Ck levels in am - they are trending down.   -need ambulatory pulse ox today - nursing staff instructed. -will need outpatient PFTs once breathing has improved in likely 4 weeks   -nicotine patch, CIWA protocol  -supportive care   -counseld to quit alcohol and tobacco       If does better later today and amb pulse ox looks good- he might be able to get discharged later in the evening. Subjective:  States he feels a little better.      Review of systems:    Constitutional: denies fevers, chills, myalgias  Respiratory: denies SOB, cough  Cardiovascular: denies chest pain, palpitations  Gastrointestinal: denies nausea, vomiting, diarrhea      Vital signs/Intake and Output:  Visit Vitals    /89 (BP 1 Location: Right arm, BP Patient Position: Sitting)    Pulse (!) 109    Temp 99.8 °F (37.7 °C)    Resp 17    Ht 5' 8\" (1.727 m)    Wt 54 kg (119 lb)    SpO2 94%    BMI 18.09 kg/m2     Current Shift:     Last three shifts:  03/15 1901 - 03/17 0700  In: 3611.7 [P.O.:1400; I.V.:2211.7]  Out: -     Exam:    General: Well developed, alert, NAD, OX3  Head/Neck: NCAT, supple, No masses, No lymphadenopathy  CVS:Regular rate and rhythm, no M/R/G, S1/S2 heard, no thrill  Lungs:slightly exp coarse BS mainly at bases. Abdomen: Soft, Nontender, No distention, Normal Bowel sounds, No hepatomegaly  Extremities: No C/C/E, pulses palpable 2+  Skin:normal texture and turgor, no rashes, no lesions  Neuro:grossly normal , follows commands  Psych:appropriate                Labs: Results:       Chemistry Recent Labs      03/17/18   0525 03/16/18   0414  03/15/18   1112   GLU  126*  157*  192*   NA  139  139  135*   K  4.1  3.1*  3.7   CL  104  102  97*   CO2  28  25  24   BUN  10  7  7   CREA  0.97  1.35*  1.12   CA  8.3*  8.1*  8.5   AGAP  7  12  14   BUCR  10*  5*  6*   AP  63  73  87   TP  5.9*  6.7  7.3   ALB  2.8*  3.3*  3.9   GLOB  3.1  3.4  3.4   AGRAT  0.9  1.0  1.1      CBC w/Diff Recent Labs      03/16/18   0414  03/15/18   1112   WBC  7.1  7.0   RBC  4.62*  4.58*   HGB  12.8*  13.1   HCT  39.2  39.5   PLT  131*  121*   GRANS   --   70   LYMPH   --   7*   EOS   --   0      Cardiac Enzymes Recent Labs      03/17/18   0525  03/16/18   0414  03/15/18   1112   CPK  1255*  1437*  1496*   CKND1   --    --   0.1      Coagulation No results for input(s): PTP, INR, APTT in the last 72 hours. No lab exists for component: INREXT    Lipid Panel No results found for: CHOL, CHOLPOCT, CHOLX, CHLST, CHOLV, 948747, HDL, LDL, LDLC, DLDLP, 891630, VLDLC, VLDL, TGLX, TRIGL, TRIGP, TGLPOCT, CHHD, CHHDX   BNP No results for input(s): BNPP in the last 72 hours.    Liver Enzymes Recent Labs      03/17/18   0525   TP  5.9*   ALB  2.8*   AP  63   SGOT  63*      Thyroid Studies No results found for: T4, T3U, TSH, TSHEXT     Procedures/imaging: see electronic medical records for all procedures/Xrays and details which were not copied into this note but were reviewed prior to creation of Melissa Betancur MD

## 2018-03-18 LAB
ATRIAL RATE: 82 BPM
CALCULATED P AXIS, ECG09: 84 DEGREES
CALCULATED R AXIS, ECG10: -64 DEGREES
CALCULATED T AXIS, ECG11: 75 DEGREES
DIAGNOSIS, 93000: NORMAL
P-R INTERVAL, ECG05: 124 MS
Q-T INTERVAL, ECG07: 376 MS
QRS DURATION, ECG06: 88 MS
QTC CALCULATION (BEZET), ECG08: 439 MS
VENTRICULAR RATE, ECG03: 82 BPM

## 2018-03-18 NOTE — DISCHARGE SUMMARY
Discharge Summary    Patient: Daniel Rosenberg MRN: [id number]  CSN: [id number]    YOB: 1960  Age: 56 y.o. Sex: male    DOA: 3/15/2018 LOS:  LOS: 2 days   Discharge Date:      Primary Care Provider:  Rafi Sanford MD    Admission Diagnoses: Pneumonia  PNA (pneumonia)  Hypoxia    Discharge Diagnoses:    Problem List as of 3/17/2018  Date Reviewed: 3/15/2018          Codes Class Noted - Resolved    * (Principal)Pneumonia ICD-10-CM: J18.9  ICD-9-CM: 696  3/15/2018 - Present        Acute respiratory distress ICD-10-CM: R06.03  ICD-9-CM: 518.82  3/15/2018 - Present        Hypoxia ICD-10-CM: R09.02  ICD-9-CM: 799.02  3/15/2018 - Present        Tobacco use ICD-10-CM: Z72.0  ICD-9-CM: 305.1  3/15/2018 - Present        Alcohol use ICD-10-CM: Z78.9  ICD-9-CM: V49.89  3/15/2018 - Present        Rhabdomyolysis ICD-10-CM: M62.82  ICD-9-CM: 728.88  3/15/2018 - Present        PNA (pneumonia) ICD-10-CM: J18.9  ICD-9-CM: 486  3/15/2018 - Present              Discharge Medications:     Current Discharge Medication List      START taking these medications    Details   levoFLOXacin (LEVAQUIN) 750 mg tablet Take 1 Tab by mouth daily for 5 doses. Qty: 5 Tab, Refills: 0      albuterol (PROVENTIL HFA, VENTOLIN HFA, PROAIR HFA) 90 mcg/actuation inhaler Take 1 Puff by inhalation every six (6) hours as needed for Wheezing for up to 30 days. Qty: 1 Inhaler, Refills: 0      predniSONE (DELTASONE) 20 mg tablet Take 2 Tabs by mouth daily (with breakfast) for 4 days. Qty: 8 Tab, Refills: 0             Discharge Condition: Stable    Procedures : None    Consults: None      PHYSICAL EXAM   Visit Vitals    /75 (BP 1 Location: Left arm, BP Patient Position: Sitting)    Pulse 87    Temp 99.6 °F (37.6 °C)    Resp 18    Ht 5' 8\" (1.727 m)    Wt 54 kg (119 lb)    SpO2 95%    BMI 18.09 kg/m2     General: Awake, cooperative, no acute distress    HEENT: NC, Atraumatic. PERRLA, EOMI. Anicteric sclerae.   Lungs:  CTA Bilaterally. No Wheezing/Rhonchi/Rales. Heart:  Regular  rhythm,  No murmur, No Rubs, No Gallops  Abdomen: Soft, Non distended, Non tender. +Bowel sounds,   Extremities: No c/c/e  Psych:   Not anxious or agitated. Neurologic:  No acute neurological deficits. Admission HPI :   Bhanu Higgins is a 56 y.o. male who comes to the ed with complaints of sob. Patient states for the past 3 days he has noticed increase in SOB and subjective fevers. He doesn't know what his temp was since his sister took it yesterday but states it was high. He works at [employer] with likely sick people exposure, smokes 3-4 cig/ per day >30 yrs and drinks 6pk beer per day >10 yrs. But what made him come today is because he felt very light headed this morning and dizzy. Admit of poor po intake during this time. Doesn't take any meds at home. No known medical hx. In the ED he was noted to be hypoxic with sat mid 80s on RA especially with ambulation. CTA was done as her D dimer was elevated. CTA showed multifocal pna. ABx were started. Labs also showed elevated Ck. On exam he was having exp wheezing along with mild corase BS. After getting IVF, his dizziness had resolved        Hospital Course :   During the course of his admission he was started on broad spectrum Antiobiotics which was later narrowed to Levaquin. CTA of the chest was done in the ED which was neg for PE but showed multifocal PNA. He was also on Oxygen due to hypoxia which was later weaned off. Due to concerns of underlying undiagnosed copd exac, he was given IV steroids, and nebs which helped him a lot. He was placed on CIWA protocol due to history of alcohol use but did not show any signs of active withdrawal. Ambulatory pulse ox did not show any desaturations on the day of discharge. He did well over the course of next 48 hrs and today is deemed stable to be discharged with out patient follow up with PCP in one week.  He should also follow up with PCP once this illness has improved for PFTs. Counseled him to quit alcohol and tobacco use. He is being discharged on levaquin PO, Prednisone 40mg po for 4 days and Albuterol to be taken as needed. Activity: Return to home activity as tolerated    Diet: Regular    Follow-up: PCP in one week, Get a referral for Pulm for outpatient PFTs. Disposition: Stable for Discharge. Minutes spent on discharge: >30 mins       Labs: Results:       Chemistry Recent Labs      03/17/18   0525  03/16/18   0414  03/15/18   1112   GLU  126*  157*  192*   NA  139  139  135*   K  4.1  3.1*  3.7   CL  104  102  97*   CO2  28  25  24   BUN  10  7  7   CREA  0.97  1.35*  1.12   CA  8.3*  8.1*  8.5   AGAP  7  12  14   BUCR  10*  5*  6*   AP  63  73  87   TP  5.9*  6.7  7.3   ALB  2.8*  3.3*  3.9   GLOB  3.1  3.4  3.4   AGRAT  0.9  1.0  1.1      CBC w/Diff Recent Labs      03/16/18   0414  03/15/18   1112   WBC  7.1  7.0   RBC  4.62*  4.58*   HGB  12.8*  13.1   HCT  39.2  39.5   PLT  131*  121*   GRANS   --   70   LYMPH   --   7*   EOS   --   0      Cardiac Enzymes Recent Labs      03/17/18   0525  03/16/18   0414  03/15/18   1112   CPK  1255*  1437*  1496*   CKND1   --    --   0.1      Coagulation No results for input(s): PTP, INR, APTT in the last 72 hours. No lab exists for component: INREXT, INREXT    Lipid Panel No results found for: CHOL, CHOLPOCT, CHOLX, CHLST, CHOLV, 305997, HDL, LDL, LDLC, DLDLP, 811720, VLDLC, VLDL, TGLX, TRIGL, TRIGP, TGLPOCT, CHHD, CHHDX   BNP No results for input(s): BNPP in the last 72 hours. Liver Enzymes Recent Labs      03/17/18   0525   TP  5.9*   ALB  2.8*   AP  63   SGOT  63*      Thyroid Studies No results found for: T4, T3U, TSH, TSHEXT, TSHEXT         Significant Diagnostic Studies: Ct Head Wo Cont    Result Date: 3/15/2018  EXAM: CT head INDICATION: Headache and dizziness COMPARISON: None.  TECHNIQUE: Axial CT imaging of the head was performed without intravenous contrast. One or more dose reduction techniques were used on this CT: automated exposure control, adjustment of the mAs and/or kVp according to patient's size, and iterative reconstruction techniques. The specific techniques utilized on this CT exam have been documented in the patient's electronic medical record._______________ FINDINGS: BRAIN AND POSTERIOR FOSSA: Cortical sulci volume is within normal limits for patient age. The ventricular size and configuration is within normal limits. Basilar cisterns are patent. There is no intracranial hemorrhage, mass effect, or midline shift. Gray-white matter differentiation is within normal limits. EXTRA-AXIAL SPACES AND MENINGES: No acute extra-axial fluid collection. Small, punctate foci of tentorial calcification noted bilaterally. CALVARIUM: No acute osseous abnormality SINUSES: Imaged paranasal sinuses and mastoid air cells are clear. OTHER: Faint atherosclerotic calcification of the carotid siphons is noted. _______________     IMPRESSION: 1. No acute intracranial abnormality. Cta Chest W Or W Wo Cont    Addendum Date: 3/15/2018    Addendum: Addendum: Not mentioned above is severe appearing short segment stenosis proximal right renal artery, mild to moderate stenosis proximal left renal artery. Result Date: 3/15/2018  EXAM: CTA Chest INDICATION: Shortness of breath, elevated d-dimer. Possible PE. COMPARISON: No prior study. TECHNIQUE: Axial CT imaging from the thoracic inlet through the diaphragm with intravenous contrast utilizing CTA study for pulmonary artery evaluation. Coronal and sagittal MIP reformations were generated at a separate workstation. Dose reduction: One or more dose reduction techniques were used on this CT: automated exposure control, adjustment of the mAs and/or kVp according to patient's size, and iterative reconstruction techniques.  The specific techniques utilized on this CT exam have been documented in the patient's electronic medical record. _______________ FINDINGS: EXAM QUALITY: Overall exam quality is somewhat limited but still fairly diagnostic. Pulmonary arterial enhancement is somewhat limited. The breath hold is satisfactory. PULMONARY ARTERIES: No definite filling defect pulmonary arterial system to suggest pulmonary embolus, suboptimal degree of contrast opacification. MEDIASTINUM: Normal heart size. No evidence of right heart strain. Aorta is unremarkable. No pericardial effusion. LYMPH NODES: Several subcentimeter mediastinal lymph nodes nonspecific, likely benign reactive nodes. AIRWAY: Unremarkable. LUNGS: There are multifocal patchy areas of airspace filling density involving posterior basilar left lower lobe, inferior middle lobe, and central medial aspect right lower lobe. Additional small peripheral patchy parenchymal densities involving anterior and superior right upper lobe. No evidence of parenchymal mass. PLEURA: No pleural effusion or pneumothorax. UPPER ABDOMEN: Visualized upper abdomen is unremarkable. . OTHER: No acute or aggressive osseous abnormalities identified. Thoracic scoliotic curvature with fusion anomaly of the T1 and T2 vertebrae. _______________     IMPRESSION: 1. Suboptimal contrast opacification, although no definite evidence of pulmonary embolism. 2.  Multifocal patchy areas of airspace filling bilateral lungs most pronounced left lower lobe and middle lobe, most suggestive of multifocal pneumonia. Xr Chest Port    Result Date: 3/15/2018  EXAM: One-view chest CLINICAL HISTORY: cough , COMPARISON: None FINDINGS: Frontal view of the chest demonstrate clear lungs. Cardiac silhouette is normal in size and contour. No acute bony or soft tissue abnormality. IMPRESSION: No acute pulmonary process identified. No results found for this or any previous visit.         CC: Rafi Sanford MD

## 2018-03-18 NOTE — DISCHARGE INSTRUCTIONS
DISCHARGE SUMMARY from Nurse    PATIENT INSTRUCTIONS:    After general anesthesia or intravenous sedation, for 24 hours or while taking prescription Narcotics:  · Limit your activities  · Do not drive and operate hazardous machinery  · Do not make important personal or business decisions  · Do  not drink alcoholic beverages  · If you have not urinated within 8 hours after discharge, please contact your surgeon on call. Report the following to your surgeon:  · Excessive pain, swelling, redness or odor of or around the surgical area  · Temperature over 100.5  · Nausea and vomiting lasting longer than 4 hours or if unable to take medications  · Any signs of decreased circulation or nerve impairment to extremity: change in color, persistent  numbness, tingling, coldness or increase pain  · Any questions    What to do at Home:  Recommended activity: Activity as tolerated,     If you experience any of the following symptoms fever, Short of breath, please follow up with PCP. *  Please give a list of your current medications to your Primary Care Provider. *  Please update this list whenever your medications are discontinued, doses are      changed, or new medications (including over-the-counter products) are added. *  Please carry medication information at all times in case of emergency situations. These are general instructions for a healthy lifestyle:    No smoking/ No tobacco products/ Avoid exposure to second hand smoke  Surgeon General's Warning:  Quitting smoking now greatly reduces serious risk to your health.     Obesity, smoking, and sedentary lifestyle greatly increases your risk for illness    A healthy diet, regular physical exercise & weight monitoring are important for maintaining a healthy lifestyle    You may be retaining fluid if you have a history of heart failure or if you experience any of the following symptoms:  Weight gain of 3 pounds or more overnight or 5 pounds in a week, increased swelling in our hands or feet or shortness of breath while lying flat in bed. Please call your doctor as soon as you notice any of these symptoms; do not wait until your next office visit. Recognize signs and symptoms of STROKE:    F-face looks uneven    A-arms unable to move or move unevenly    S-speech slurred or non-existent    T-time-call 911 as soon as signs and symptoms begin-DO NOT go       Back to bed or wait to see if you get better-TIME IS BRAIN. Warning Signs of HEART ATTACK     Call 911 if you have these symptoms:   Chest discomfort. Most heart attacks involve discomfort in the center of the chest that lasts more than a few minutes, or that goes away and comes back. It can feel like uncomfortable pressure, squeezing, fullness, or pain.  Discomfort in other areas of the upper body. Symptoms can include pain or discomfort in one or both arms, the back, neck, jaw, or stomach.  Shortness of breath with or without chest discomfort.  Other signs may include breaking out in a cold sweat, nausea, or lightheadedness. Don't wait more than five minutes to call 911 - MINUTES MATTER! Fast action can save your life. Calling 911 is almost always the fastest way to get lifesaving treatment. Emergency Medical Services staff can begin treatment when they arrive -- up to an hour sooner than if someone gets to the hospital by car. The discharge information has been reviewed with the patient. The patient verbalized understanding. Discharge medications reviewed with the patient and appropriate educational materials and side effects teaching were provided.   ___________________________________________________________________________________________________________________________________

## 2018-03-18 NOTE — PROGRESS NOTES
Shift summary:     Patient cleared for discharge, provided with d/c education, review of medications and follow-up appointment. All questions/concerns addressed.      Patient Vitals for the past 12 hrs:   Temp Pulse Resp BP SpO2   03/17/18 1957 99.6 °F (37.6 °C) 87 18 133/75 95 %   03/17/18 1630 98.8 °F (37.1 °C) 75 18 131/78 100 %   03/17/18 1259 99.2 °F (37.3 °C) 97 18 132/85 99 %

## 2018-03-21 LAB
BACTERIA SPEC CULT: NORMAL
BACTERIA SPEC CULT: NORMAL
SERVICE CMNT-IMP: NORMAL
SERVICE CMNT-IMP: NORMAL

## 2021-08-03 PROBLEM — J18.9 PNEUMONIA: Status: RESOLVED | Noted: 2018-03-15 | Resolved: 2021-08-03

## 2022-03-18 PROBLEM — R09.02 HYPOXIA: Status: ACTIVE | Noted: 2018-03-15

## 2022-03-19 PROBLEM — Z78.9 ALCOHOL USE: Status: ACTIVE | Noted: 2018-03-15

## 2022-03-19 PROBLEM — M62.82 RHABDOMYOLYSIS: Status: ACTIVE | Noted: 2018-03-15

## 2022-03-19 PROBLEM — J18.9 PNA (PNEUMONIA): Status: ACTIVE | Noted: 2018-03-15

## 2022-03-20 PROBLEM — Z72.0 TOBACCO USE: Status: ACTIVE | Noted: 2018-03-15

## 2022-03-20 PROBLEM — R06.03 ACUTE RESPIRATORY DISTRESS: Status: ACTIVE | Noted: 2018-03-15

## 2022-07-19 ENCOUNTER — HOSPITAL ENCOUNTER (EMERGENCY)
Age: 62
Discharge: HOME | End: 2022-07-20
Attending: EMERGENCY MEDICINE
Payer: COMMERCIAL

## 2022-07-19 ENCOUNTER — APPOINTMENT (OUTPATIENT)
Dept: GENERAL RADIOLOGY | Age: 62
End: 2022-07-19
Attending: EMERGENCY MEDICINE
Payer: COMMERCIAL

## 2022-07-19 DIAGNOSIS — R42 DIZZY: Primary | ICD-10-CM

## 2022-07-19 DIAGNOSIS — R42 VERTIGO: ICD-10-CM

## 2022-07-19 LAB
ALBUMIN SERPL-MCNC: 5.2 G/DL (ref 3.4–5)
ALBUMIN/GLOB SERPL: 1.2 {RATIO} (ref 0.8–1.7)
ALP SERPL-CCNC: 140 U/L (ref 45–117)
ALT SERPL-CCNC: 41 U/L (ref 16–61)
ANION GAP SERPL CALC-SCNC: 8 MMOL/L (ref 3–18)
AST SERPL-CCNC: 45 U/L (ref 10–38)
BASOPHILS # BLD: 0 K/UL (ref 0–0.1)
BASOPHILS NFR BLD: 1 % (ref 0–2)
BILIRUB SERPL-MCNC: 1 MG/DL (ref 0.2–1)
BUN SERPL-MCNC: 9 MG/DL (ref 7–18)
BUN/CREAT SERPL: 8 (ref 12–20)
CALCIUM SERPL-MCNC: 10.4 MG/DL (ref 8.5–10.1)
CHLORIDE SERPL-SCNC: 97 MMOL/L (ref 100–111)
CO2 SERPL-SCNC: 30 MMOL/L (ref 21–32)
CREAT SERPL-MCNC: 1.08 MG/DL (ref 0.6–1.3)
DIFFERENTIAL METHOD BLD: ABNORMAL
EOSINOPHIL # BLD: 0.2 K/UL (ref 0–0.4)
EOSINOPHIL NFR BLD: 3 % (ref 0–5)
ERYTHROCYTE [DISTWIDTH] IN BLOOD BY AUTOMATED COUNT: 12.6 % (ref 11.6–14.5)
GLOBULIN SER CALC-MCNC: 4.2 G/DL (ref 2–4)
GLUCOSE BLD STRIP.AUTO-MCNC: 134 MG/DL (ref 70–110)
GLUCOSE SERPL-MCNC: 132 MG/DL (ref 74–99)
HCT VFR BLD AUTO: 46.5 % (ref 36–48)
HGB BLD-MCNC: 15.8 G/DL (ref 13–16)
IMM GRANULOCYTES # BLD AUTO: 0 K/UL (ref 0–0.04)
IMM GRANULOCYTES NFR BLD AUTO: 0 % (ref 0–0.5)
LIPASE SERPL-CCNC: 97 U/L (ref 73–393)
LYMPHOCYTES # BLD: 1 K/UL (ref 0.9–3.6)
LYMPHOCYTES NFR BLD: 15 % (ref 21–52)
MAGNESIUM SERPL-MCNC: 2.1 MG/DL (ref 1.6–2.6)
MCH RBC QN AUTO: 29.6 PG (ref 24–34)
MCHC RBC AUTO-ENTMCNC: 34 G/DL (ref 31–37)
MCV RBC AUTO: 87.1 FL (ref 78–100)
MONOCYTES # BLD: 0.5 K/UL (ref 0.05–1.2)
MONOCYTES NFR BLD: 8 % (ref 3–10)
NEUTS SEG # BLD: 4.8 K/UL (ref 1.8–8)
NEUTS SEG NFR BLD: 74 % (ref 40–73)
NRBC # BLD: 0 K/UL (ref 0–0.01)
NRBC BLD-RTO: 0 PER 100 WBC
PLATELET # BLD AUTO: 193 K/UL (ref 135–420)
PMV BLD AUTO: 10.6 FL (ref 9.2–11.8)
POTASSIUM SERPL-SCNC: 3.6 MMOL/L (ref 3.5–5.5)
PROT SERPL-MCNC: 9.4 G/DL (ref 6.4–8.2)
RBC # BLD AUTO: 5.34 M/UL (ref 4.35–5.65)
SODIUM SERPL-SCNC: 135 MMOL/L (ref 136–145)
TROPONIN-HIGH SENSITIVITY: 6 NG/L (ref 0–78)
WBC # BLD AUTO: 6.5 K/UL (ref 4.6–13.2)

## 2022-07-19 PROCEDURE — 99285 EMERGENCY DEPT VISIT HI MDM: CPT

## 2022-07-19 PROCEDURE — 96374 THER/PROPH/DIAG INJ IV PUSH: CPT

## 2022-07-19 PROCEDURE — 85025 COMPLETE CBC W/AUTO DIFF WBC: CPT

## 2022-07-19 PROCEDURE — 71045 X-RAY EXAM CHEST 1 VIEW: CPT

## 2022-07-19 PROCEDURE — 74011250636 HC RX REV CODE- 250/636: Performed by: EMERGENCY MEDICINE

## 2022-07-19 PROCEDURE — 93005 ELECTROCARDIOGRAM TRACING: CPT

## 2022-07-19 PROCEDURE — 80053 COMPREHEN METABOLIC PANEL: CPT

## 2022-07-19 PROCEDURE — 82962 GLUCOSE BLOOD TEST: CPT

## 2022-07-19 PROCEDURE — 83735 ASSAY OF MAGNESIUM: CPT

## 2022-07-19 PROCEDURE — 83690 ASSAY OF LIPASE: CPT

## 2022-07-19 PROCEDURE — 96361 HYDRATE IV INFUSION ADD-ON: CPT

## 2022-07-19 PROCEDURE — 84484 ASSAY OF TROPONIN QUANT: CPT

## 2022-07-19 RX ORDER — ONDANSETRON 2 MG/ML
4 INJECTION INTRAMUSCULAR; INTRAVENOUS
Status: COMPLETED | OUTPATIENT
Start: 2022-07-19 | End: 2022-07-19

## 2022-07-19 RX ADMIN — ONDANSETRON 4 MG: 2 INJECTION INTRAMUSCULAR; INTRAVENOUS at 20:33

## 2022-07-19 NOTE — Clinical Note
Cherri Martinez was seen and treated in our emergency department on 7/19/2022. Patient should be excused from work until July 27. After which she can return to work without restrictions. Should symptoms continue he should be cleared by his regular doctor.     MD Bella Childers MD

## 2022-07-20 ENCOUNTER — APPOINTMENT (OUTPATIENT)
Dept: CT IMAGING | Age: 62
End: 2022-07-20
Attending: EMERGENCY MEDICINE
Payer: COMMERCIAL

## 2022-07-20 VITALS
HEART RATE: 66 BPM | SYSTOLIC BLOOD PRESSURE: 160 MMHG | DIASTOLIC BLOOD PRESSURE: 93 MMHG | OXYGEN SATURATION: 99 % | WEIGHT: 107 LBS | RESPIRATION RATE: 14 BRPM | BODY MASS INDEX: 16.22 KG/M2 | TEMPERATURE: 98.6 F | HEIGHT: 68 IN

## 2022-07-20 PROCEDURE — 70450 CT HEAD/BRAIN W/O DYE: CPT

## 2022-07-20 PROCEDURE — 74011250636 HC RX REV CODE- 250/636: Performed by: EMERGENCY MEDICINE

## 2022-07-20 RX ORDER — MECLIZINE HCL 12.5 MG 12.5 MG/1
25 TABLET ORAL
Status: COMPLETED | OUTPATIENT
Start: 2022-07-20 | End: 2022-07-20

## 2022-07-20 RX ORDER — MECLIZINE HYDROCHLORIDE 25 MG/1
25 TABLET ORAL
Qty: 12 TABLET | Refills: 0 | Status: SHIPPED | OUTPATIENT
Start: 2022-07-20 | End: 2022-07-30

## 2022-07-20 RX ADMIN — MECLIZINE 25 MG: 12.5 TABLET ORAL at 00:05

## 2022-07-20 RX ADMIN — SODIUM CHLORIDE 1000 ML: 9 INJECTION, SOLUTION INTRAVENOUS at 00:05

## 2022-07-20 NOTE — ED PROVIDER NOTES
EMERGENCY DEPARTMENT HISTORY AND PHYSICAL EXAM    Date: 7/19/2022  Patient Name: Rubio Madera    History of Presenting lightheadedness     Chief Complaint   Patient presents with    Vomiting    Dizziness       History Provided By: Patient and Patient's Son     History Cierra Gracia):   8:22 PM  Rubio Madera is a 63 y.o. male with a PMHX of DM  who presents to the emergency department (room 15) C/O lightheadedness onset this morning. Associated sxs include nausea and vomiting. Pt denies chest pain, shortness of breath, fever, chills or any other sxs or complaints. Patient states that he has had several episodes of similar feelings over the last 4 months since being diagnosed with diabetes. Patient does not check his sugars regularly at home. Chief Complaint: Lightheadedness  Onset: Today  Timing:  Acute on Subacute  Context: Symptoms started spontaneously, symptoms have rapidly worsened since onset  Location: Generalized  Quality:  Painless  Severity: Moderate  Modifying Factors: Nothing makes it better, or worse. Associated Symptoms:  Nausea, vomiting    PCP: Other, MD Rafi     Past History         Past Medical History:  Past Medical History:   Diagnosis Date    Diabetes ([facility] [address]        Past Surgical History:  No past surgical history on file. Family History:  No family history on file. Reviewed and non-contributory    Social History:  Social History     Tobacco Use    Smoking status: Former     Packs/day: 0.50     Types: Cigarettes    Smokeless tobacco: Never   Substance Use Topics    Alcohol use: Yes     Alcohol/week: 42.0 standard drinks     Types: 42 Cans of beer per week     Comment: drinks a 6 pack per day     Drug use: Yes     Types: Marijuana     Comment: smokes once a week       Medications: Allergies:  No Known Allergies    Review of Systems      Review of Systems   Constitutional:  Negative for chills and fever. HENT:  Negative for rhinorrhea and sore throat.     Eyes: Negative for pain and visual disturbance. Respiratory:  Negative for chest tightness, shortness of breath and wheezing. Cardiovascular:  Negative for chest pain and palpitations. Gastrointestinal:  Positive for nausea and vomiting. Negative for abdominal pain and diarrhea. Musculoskeletal:  Negative for arthralgias and myalgias. Skin:  Negative for rash and wound. Neurological:  Positive for light-headedness. Negative for speech difficulty and headaches. Psychiatric/Behavioral:  Negative for agitation and confusion. All other systems reviewed and are negative. Physical Exam     Vitals:    07/19/22 2249 07/19/22 2348 07/20/22 0317 07/20/22 0402   BP: (!) 156/95 (!) 164/94 (!) 166/99 (!) 160/93   Pulse: 88 64 61 66   Resp: 13 13 14 14   Temp:       SpO2: 100% 100% 100% 99%   Weight:       Height:           Physical Exam  Vitals and nursing note reviewed. Constitutional:       General: He is not in acute distress. Appearance: Normal appearance. He is normal weight. He is not ill-appearing. HENT:      Head: Normocephalic and atraumatic. Nose: Nose normal. No rhinorrhea. Mouth/Throat:      Mouth: Mucous membranes are moist.      Pharynx: No oropharyngeal exudate or posterior oropharyngeal erythema. Eyes:      General: No visual field deficit. Extraocular Movements: Extraocular movements intact. Conjunctiva/sclera: Conjunctivae normal.      Pupils: Pupils are equal, round, and reactive to light. Cardiovascular:      Rate and Rhythm: Normal rate and regular rhythm. Heart sounds: No murmur heard. No friction rub. No gallop. Pulmonary:      Effort: Pulmonary effort is normal. No respiratory distress. Breath sounds: Normal breath sounds. No wheezing, rhonchi or rales. Abdominal:      General: Bowel sounds are normal.      Palpations: Abdomen is soft. Tenderness: There is no abdominal tenderness. There is no guarding or rebound.    Musculoskeletal: General: No swelling, tenderness or deformity. Normal range of motion. Cervical back: Normal range of motion and neck supple. No rigidity. Lymphadenopathy:      Cervical: No cervical adenopathy. Skin:     General: Skin is warm and dry. Findings: No rash. Neurological:      General: No focal deficit present. Mental Status: He is alert and oriented to person, place, and time. Cranial Nerves: Cranial nerves are intact. No cranial nerve deficit, dysarthria or facial asymmetry. Sensory: Sensation is intact. No sensory deficit. Motor: Motor function is intact. No weakness, tremor, atrophy, abnormal muscle tone, seizure activity or pronator drift. Coordination: Coordination is intact. Romberg sign negative. Coordination normal. Finger-Nose-Finger Test normal. Rapid alternating movements normal.      Gait: Gait is intact.       Comments: No truncal ataxia, normal test of skew   Psychiatric:         Mood and Affect: Mood normal.         Behavior: Behavior normal.       Diagnostic Study Results     Labs -  Recent Results (from the past 12 hour(s))   EKG, 12 LEAD, INITIAL    Collection Time: 07/19/22  6:42 PM   Result Value Ref Range    Ventricular Rate 59 BPM    Atrial Rate 59 BPM    P-R Interval 114 ms    QRS Duration 88 ms    Q-T Interval 456 ms    QTC Calculation (Bezet) 451 ms    Calculated P Axis 88 degrees    Calculated R Axis -28 degrees    Calculated T Axis 38 degrees    Diagnosis       Sinus bradycardia  Biatrial enlargement  Anteroseptal infarct , age undetermined  Abnormal ECG  When compared with ECG of 15-MAR-2018 11:06,  Anteroseptal infarct is now present     GLUCOSE, POC    Collection Time: 07/19/22  6:58 PM   Result Value Ref Range    Glucose (POC) 134 (H) 70 - 110 mg/dL   EKG, 12 LEAD, INITIAL    Collection Time: 07/19/22  8:18 PM   Result Value Ref Range    Ventricular Rate 62 BPM    Atrial Rate 62 BPM    P-R Interval 118 ms    QRS Duration 84 ms    Q-T Interval 430 ms    QTC Calculation (Bezet) 436 ms    Calculated P Axis 84 degrees    Calculated R Axis -56 degrees    Calculated T Axis 70 degrees    Diagnosis       Normal sinus rhythm  Biatrial enlargement  Pulmonary disease pattern  Left anterior fascicular block  Septal infarct (cited on or before 19-JUL-2022)  T wave abnormality, consider anterior ischemia  Abnormal ECG  When compared with ECG of 19-JUL-2022 18:42,  Questionable change in initial forces of Anteroseptal leads     CBC WITH AUTOMATED DIFF    Collection Time: 07/19/22  9:20 PM   Result Value Ref Range    WBC 6.5 4.6 - 13.2 K/uL    RBC 5.34 4.35 - 5.65 M/uL    HGB 15.8 13.0 - 16.0 g/dL    HCT 46.5 36.0 - 48.0 %    MCV 87.1 78.0 - 100.0 FL    MCH 29.6 24.0 - 34.0 PG    MCHC 34.0 31.0 - 37.0 g/dL    RDW 12.6 11.6 - 14.5 %    PLATELET 280 941 - 286 K/uL    MPV 10.6 9.2 - 11.8 FL    NRBC 0.0 0  WBC    ABSOLUTE NRBC 0.00 0.00 - 0.01 K/uL    NEUTROPHILS 74 (H) 40 - 73 %    LYMPHOCYTES 15 (L) 21 - 52 %    MONOCYTES 8 3 - 10 %    EOSINOPHILS 3 0 - 5 %    BASOPHILS 1 0 - 2 %    IMMATURE GRANULOCYTES 0 0.0 - 0.5 %    ABS. NEUTROPHILS 4.8 1.8 - 8.0 K/UL    ABS. LYMPHOCYTES 1.0 0.9 - 3.6 K/UL    ABS. MONOCYTES 0.5 0.05 - 1.2 K/UL    ABS. EOSINOPHILS 0.2 0.0 - 0.4 K/UL    ABS. BASOPHILS 0.0 0.0 - 0.1 K/UL    ABS. IMM. GRANS. 0.0 0.00 - 0.04 K/UL    DF AUTOMATED     METABOLIC PANEL, COMPREHENSIVE    Collection Time: 07/19/22  9:20 PM   Result Value Ref Range    Sodium 135 (L) 136 - 145 mmol/L    Potassium 3.6 3.5 - 5.5 mmol/L    Chloride 97 (L) 100 - 111 mmol/L    CO2 30 21 - 32 mmol/L    Anion gap 8 3.0 - 18 mmol/L    Glucose 132 (H) 74 - 99 mg/dL    BUN 9 7.0 - 18 MG/DL    Creatinine 1.08 0.6 - 1.3 MG/DL    BUN/Creatinine ratio 8 (L) 12 - 20      GFR est AA >60 >60 ml/min/1.73m2    GFR est non-AA >60 >60 ml/min/1.73m2    Calcium 10.4 (H) 8.5 - 10.1 MG/DL    Bilirubin, total 1.0 0.2 - 1.0 MG/DL    ALT (SGPT) 41 16 - 61 U/L    AST (SGOT) 45 (H) 10 - 38 U/L    Alk. phosphatase 140 (H) 45 - 117 U/L    Protein, total 9.4 (H) 6.4 - 8.2 g/dL    Albumin 5.2 (H) 3.4 - 5.0 g/dL    Globulin 4.2 (H) 2.0 - 4.0 g/dL    A-G Ratio 1.2 0.8 - 1.7     LIPASE    Collection Time: 07/19/22  9:20 PM   Result Value Ref Range    Lipase 97 73 - 393 U/L   MAGNESIUM    Collection Time: 07/19/22  9:20 PM   Result Value Ref Range    Magnesium 2.1 1.6 - 2.6 mg/dL   TROPONIN-HIGH SENSITIVITY    Collection Time: 07/19/22  9:20 PM   Result Value Ref Range    Troponin-High Sensitivity 6 0 - 78 ng/L       Radiologic Studies -   CT HEAD WO CONT   Final Result      No acute intracranial abnormalities. XR CHEST PORT   Final Result      1. Hyperinflated lungs may be due to inspiratory effort versus air trapping   (asthma, etc). 2.  No new consolidation, pleural effusion, or pneumothorax. CT Results  (Last 48 hours)                 07/20/22 0049  CT HEAD WO CONT Final result    Impression:      No acute intracranial abnormalities. Narrative:  EXAM: CT head       INDICATION: Dizziness. COMPARISON: March 15, 2018. TECHNIQUE: Axial CT imaging of the head was performed without intravenous   contrast. Dose reduction techniques used: automated exposure control, adjustment   of the mAs and/or kVp according to patient size, and iterative reconstruction   techniques. Digital imaging and communications in Medicine (DICOM) format image   data are available to nonaffiliated external healthcare facilities or entities   on a secure, media free, reciprocally searchable basis with patient   authorization for at least 12 months after this study. _______________       FINDINGS:       BRAIN AND POSTERIOR FOSSA: The sulci, folia, ventricles and basal cisterns are   within normal limits for the patient's age. There is no intracranial hemorrhage,   mass effect, or midline shift. There are no areas of abnormal parenchymal   attenuation.        EXTRA-AXIAL SPACES AND MENINGES: There are no abnormal extra-axial fluid   collections. CALVARIUM: Intact. SINUSES: Clear. OTHER: None.       _______________                 CXR Results  (Last 48 hours)                 07/19/22 2118  XR CHEST PORT Final result    Impression:      1. Hyperinflated lungs may be due to inspiratory effort versus air trapping   (asthma, etc). 2.  No new consolidation, pleural effusion, or pneumothorax. Narrative:  EXAM: PORTABLE  FRONTAL CHEST RADIOGRAPH       CLINICAL INDICATION/HISTORY: Lightheadedness. History of diabetes. COMPARISON: CT 3/15/2018       TECHNIQUE: Portable frontal view of the chest       _______________       FINDINGS:       SUPPORT DEVICES: EKG leads overlie the patient. HEART AND MEDIASTINUM: Normal heart size and mediastinal contours. LUNGS: Lungs are hyperinflated. No suspicious pulmonary opacities. PLEURAL SPACES:No large pneumothorax. No large pleural effusion. BONY THORAX AND SOFT TISSUES: No acute abnormality. _______________                   Medications given in the ED-  Medications   ondansetron [facility]) injection 4 mg (4 mg IntraVENous Given 7/19/22 2033)   sodium chloride 0.9 % bolus infusion 1,000 mL (0 mL IntraVENous IV Completed 7/20/22 0331)   meclizine (ANTIVERT) tablet 25 mg (25 mg Oral Given 7/20/22 0005)       Procedures     Procedures    ED Course     I Yaya Paz MD) am the first provider for this patient. I reviewed the vital signs, available nursing notes, past medical history, past surgical history, family history and social history. Records Reviewed: Nursing Notes    Cardiac Monitor:  Rate: 62 bpm  Rhythm: sinus rhythm    Pulse Oximetry Analysis - 100% on RA    EKG interpretation: (Preliminary)  Rhythm: Normal sinus rhythm. Rate: 62 bpm; no STEMI, inverted T waves in leads V2, V3  EKG read by Yaya Paz MD at 8:18 PM    8:22 PM Initial assessment performed.  The patients presenting problems have been discussed, and they are in agreement with the care plan formulated and outlined with them. I have encouraged them to ask questions as they arise throughout their visit. ED Course as of 07/20/22 0638   Wed Jul 20, 2022   0001 Patient is clarifying now that when he rolls over and rolls back to flat he gets increased dizziness. [JM]      ED Course User Index  [JM] Keerthi Taylor MD     SIGN OUT:  1:15 AM  Patient's presentation, labs/imaging and plan of care was reviewed with Dr. Vikas Rois as part of sign out. They will follow up with CT scan of the head as part of the plan discussed with the patient. Dr. Josie Blanchard assistance in completion of this plan is greatly appreciated but it should be noted that I will be the provider of record for this patient. Written by Que Mack MD.      Medical Decision Making     Provider Notes (Medical Decision Making):   DDX: Diabetes, vertigo, hypovolemia, dehydration    Discussion:  63 y.o. male with lightheadedness started this morning. Patient had worsening symptoms with changes in position in the ED. He had a normal neurological exam with a normal test of skew making posterior circulation CVA very unlikely. Patient's symptoms improved with IV hydration and meclizine. We will keep him on meclizine at home. No indication for further work-up or hospitalization. Patient may follow-up with his primary care doctor or with her neurologist.  Patient and family understand and agree with this plan. Diagnosis and Disposition     DISCHARGE NOTE:  4:38 AM  Croweugene Briceño's  results have been reviewed with him. He has been counseled regarding his diagnosis, treatment, and plan. He verbally conveys understanding and agreement of the signs, symptoms, diagnosis, treatment and prognosis and additionally agrees to follow up as discussed. He also agrees with the care-plan and conveys that all of his questions have been answered.   I have also provided discharge instructions for him that include: educational information regarding their diagnosis and treatment, and list of reasons why they would want to return to the ED prior to their follow-up appointment, should his condition change. He has been provided with education for proper emergency department utilization. CLINICAL IMPRESSION:    1. Dizzy    2. Vertigo        PLAN:  1. D/C Home  2. Discharge Medication List as of 7/20/2022  4:30 AM        3. Follow-up Information       Follow up With Specialties Details Why [address]  Schedule an appointment as soon as possible for a visit  As soon as possible, For follow up from Emergency Department visit. [address]    Alhaji Riley MD Neurology Schedule an appointment as soon as possible for a visit  As soon as possible, For follow up from Emergency Department visit. [address]  [phone]      [facility] EMERGENCY DEPT Emergency Medicine  As needed; If symptoms worsen [address] [place] [id number]  [address] [place] [address] Sheron June MD am the primary clinician of record. [employer]on Disclaimer     Please note that this dictation was completed with [employer], the computer voice recognition software. Quite often unanticipated grammatical, syntax, homophones, and other interpretive errors are inadvertently transcribed by the computer software. Please disregard these errors. Please excuse any errors that have escaped final proofreading.     Sheron June MD

## 2022-07-20 NOTE — ED NOTES
Verbal shift change report given to Shruti Viera RN (oncoming nurse). Report included the following information SBAR, ED Summary and MAR.

## 2022-07-24 LAB
ATRIAL RATE: 59 BPM
ATRIAL RATE: 62 BPM
CALCULATED P AXIS, ECG09: 84 DEGREES
CALCULATED P AXIS, ECG09: 88 DEGREES
CALCULATED R AXIS, ECG10: -28 DEGREES
CALCULATED R AXIS, ECG10: -56 DEGREES
CALCULATED T AXIS, ECG11: 38 DEGREES
CALCULATED T AXIS, ECG11: 70 DEGREES
DIAGNOSIS, 93000: NORMAL
DIAGNOSIS, 93000: NORMAL
P-R INTERVAL, ECG05: 114 MS
P-R INTERVAL, ECG05: 118 MS
Q-T INTERVAL, ECG07: 430 MS
Q-T INTERVAL, ECG07: 456 MS
QRS DURATION, ECG06: 84 MS
QRS DURATION, ECG06: 88 MS
QTC CALCULATION (BEZET), ECG08: 436 MS
QTC CALCULATION (BEZET), ECG08: 451 MS
VENTRICULAR RATE, ECG03: 59 BPM
VENTRICULAR RATE, ECG03: 62 BPM

## 2022-10-28 NOTE — ROUTINE PROCESS
1100 Shift summary: Pt is up ad jessica to bathroom, no complaint of pain, on CIWA protocol - score of Zero for shift. On RA with non-productive cough noted, Tele box - ST, ST. IV antibiotics of Vancomycin. Skin intact    Bedside and Verbal shift change report given to Magalene Aase, RN (oncoming nurse) by Jaky Cervantes RN   (offgoing nurse). Report included the following information SBAR, Kardex, Intake/Output, MAR and Recent Results.
Bedside and Verbal shift change report given to Shayy Kim RN (oncoming nurse) by Jaime Calderon RN (offgoing nurse). Report included the following information SBAR, Kardex, ED Summary, Procedure Summary, Intake/Output, MAR, Recent Results, Med Rec Status and Cardiac Rhythm NSR.
Bedside and Verbal shift change report given to Sylvia Payton RN (oncoming nurse) by Eldon Diaz RN (offgoing nurse). Report included the following information SBAR, Kardex, ED Summary, Procedure Summary, Intake/Output, MAR, Recent Results, Med Rec Status and Cardiac Rhythm NSR.
EMR entered and reviewed for the purpose of chart review in the course of performing educational functions and responsibilities related to performance improvement.
TRANSFER - IN REPORT:    Verbal report received from Mekhi Jorgensen RN (name) on Faustino Arias  being received from ED (unit) for routine progression of care      Report consisted of patients Situation, Background, Assessment and   Recommendations(SBAR). Information from the following report(s) SBAR, Kardex, ED Summary, Procedure Summary, Intake/Output, MAR, Recent Results and Med Rec Status was reviewed with the receiving nurse. Opportunity for questions and clarification was provided. Assessment completed upon patients arrival to unit and care assumed.
Occupational Therapy (OT) to evaluate and treat. Per MAULIK Guzman, pt is okay to participate in OT evaluation and perform activity as tolerated.

## 2023-11-27 NOTE — PROGRESS NOTES
Patient did well with ambulatory pulse ox without any acute signs of desaturation. He is stable to be discharged. He also desires to go home therefore will discharge him. Detail Level: Zone Initiate Treatment: APPLY CERAVE SPF OF 30 OR HIGHER

## 2023-12-12 ENCOUNTER — HOSPITAL ENCOUNTER (EMERGENCY)
Facility: HOSPITAL | Age: 63
Discharge: HOME | End: 2023-12-12
Attending: EMERGENCY MEDICINE
Payer: COMMERCIAL

## 2023-12-12 ENCOUNTER — APPOINTMENT (OUTPATIENT)
Facility: HOSPITAL | Age: 63
End: 2023-12-12
Payer: COMMERCIAL

## 2023-12-12 VITALS
BODY MASS INDEX: 15.91 KG/M2 | HEART RATE: 72 BPM | RESPIRATION RATE: 18 BRPM | OXYGEN SATURATION: 100 % | DIASTOLIC BLOOD PRESSURE: 79 MMHG | SYSTOLIC BLOOD PRESSURE: 153 MMHG | TEMPERATURE: 98 F | WEIGHT: 105 LBS | HEIGHT: 68 IN

## 2023-12-12 DIAGNOSIS — I47.10 PAROXYSMAL SUPRAVENTRICULAR TACHYCARDIA: Primary | ICD-10-CM

## 2023-12-12 LAB
ALBUMIN SERPL-MCNC: 4 G/DL (ref 3.4–5)
ALBUMIN/GLOB SERPL: 1.2 (ref 0.8–1.7)
ALP SERPL-CCNC: 123 U/L (ref 45–117)
ALT SERPL-CCNC: 45 U/L (ref 16–61)
ANION GAP SERPL CALC-SCNC: 9 MMOL/L (ref 3–18)
AST SERPL-CCNC: 65 U/L (ref 10–38)
BASOPHILS # BLD: 0.1 K/UL (ref 0–0.1)
BASOPHILS NFR BLD: 1 % (ref 0–2)
BILIRUB SERPL-MCNC: 0.9 MG/DL (ref 0.2–1)
BUN SERPL-MCNC: 9 MG/DL (ref 7–18)
BUN/CREAT SERPL: 8 (ref 12–20)
CALCIUM SERPL-MCNC: 9.8 MG/DL (ref 8.5–10.1)
CHLORIDE SERPL-SCNC: 98 MMOL/L (ref 100–111)
CO2 SERPL-SCNC: 26 MMOL/L (ref 21–32)
CREAT SERPL-MCNC: 1.15 MG/DL (ref 0.6–1.3)
DIFFERENTIAL METHOD BLD: ABNORMAL
EKG ATRIAL RATE: 77 BPM
EKG ATRIAL RATE: 96 BPM
EKG DIAGNOSIS: NORMAL
EKG DIAGNOSIS: NORMAL
EKG P AXIS: 83 DEGREES
EKG P AXIS: 87 DEGREES
EKG P-R INTERVAL: 118 MS
EKG P-R INTERVAL: 124 MS
EKG Q-T INTERVAL: 368 MS
EKG Q-T INTERVAL: 394 MS
EKG QRS DURATION: 88 MS
EKG QRS DURATION: 92 MS
EKG QTC CALCULATION (BAZETT): 445 MS
EKG QTC CALCULATION (BAZETT): 464 MS
EKG R AXIS: -59 DEGREES
EKG R AXIS: -67 DEGREES
EKG T AXIS: 74 DEGREES
EKG T AXIS: 77 DEGREES
EKG VENTRICULAR RATE: 77 BPM
EKG VENTRICULAR RATE: 96 BPM
EOSINOPHIL # BLD: 0 K/UL (ref 0–0.4)
EOSINOPHIL NFR BLD: 0 % (ref 0–5)
ERYTHROCYTE [DISTWIDTH] IN BLOOD BY AUTOMATED COUNT: 13.3 % (ref 11.6–14.5)
GLOBULIN SER CALC-MCNC: 3.3 G/DL (ref 2–4)
GLUCOSE SERPL-MCNC: 126 MG/DL (ref 74–99)
HCT VFR BLD AUTO: 43.3 % (ref 36–48)
HGB BLD-MCNC: 15.2 G/DL (ref 13–16)
IMM GRANULOCYTES # BLD AUTO: 0.1 K/UL (ref 0–0.04)
IMM GRANULOCYTES NFR BLD AUTO: 2 % (ref 0–0.5)
LYMPHOCYTES # BLD: 0.7 K/UL (ref 0.9–3.6)
LYMPHOCYTES NFR BLD: 16 % (ref 21–52)
MAGNESIUM SERPL-MCNC: 1.7 MG/DL (ref 1.6–2.6)
MCH RBC QN AUTO: 29.5 PG (ref 24–34)
MCHC RBC AUTO-ENTMCNC: 35.1 G/DL (ref 31–37)
MCV RBC AUTO: 83.9 FL (ref 78–100)
MONOCYTES # BLD: 0.5 K/UL (ref 0.05–1.2)
MONOCYTES NFR BLD: 11 % (ref 3–10)
NEUTS SEG # BLD: 3.2 K/UL (ref 1.8–8)
NEUTS SEG NFR BLD: 70 % (ref 40–73)
NRBC # BLD: 0 K/UL (ref 0–0.01)
NRBC BLD-RTO: 0 PER 100 WBC
PLATELET # BLD AUTO: 142 K/UL (ref 135–420)
PMV BLD AUTO: 9.9 FL (ref 9.2–11.8)
POTASSIUM SERPL-SCNC: 3.9 MMOL/L (ref 3.5–5.5)
PROT SERPL-MCNC: 7.3 G/DL (ref 6.4–8.2)
RBC # BLD AUTO: 5.16 M/UL (ref 4.35–5.65)
SODIUM SERPL-SCNC: 133 MMOL/L (ref 136–145)
TROPONIN I SERPL HS-MCNC: 10 NG/L (ref 0–78)
TROPONIN I SERPL HS-MCNC: 7 NG/L (ref 0–78)
WBC # BLD AUTO: 4.6 K/UL (ref 4.6–13.2)

## 2023-12-12 PROCEDURE — 84484 ASSAY OF TROPONIN QUANT: CPT

## 2023-12-12 PROCEDURE — 83735 ASSAY OF MAGNESIUM: CPT

## 2023-12-12 PROCEDURE — 71045 X-RAY EXAM CHEST 1 VIEW: CPT

## 2023-12-12 PROCEDURE — 99285 EMERGENCY DEPT VISIT HI MDM: CPT

## 2023-12-12 PROCEDURE — 93005 ELECTROCARDIOGRAM TRACING: CPT | Performed by: EMERGENCY MEDICINE

## 2023-12-12 PROCEDURE — 6370000000 HC RX 637 (ALT 250 FOR IP): Performed by: EMERGENCY MEDICINE

## 2023-12-12 PROCEDURE — 85025 COMPLETE CBC W/AUTO DIFF WBC: CPT

## 2023-12-12 PROCEDURE — 80053 COMPREHEN METABOLIC PANEL: CPT

## 2023-12-12 RX ADMIN — METOPROLOL TARTRATE 25 MG: 25 TABLET, FILM COATED ORAL at 10:08

## 2023-12-12 ASSESSMENT — PAIN - FUNCTIONAL ASSESSMENT: PAIN_FUNCTIONAL_ASSESSMENT: NONE - DENIES PAIN

## 2023-12-12 NOTE — ED TRIAGE NOTES
Pt. Arrived via EMS from [facility] states his sister recently caught covid and he has had some episodic periods of shortness of breath that he has been concerned but felt something was wrong today. [facility] noted new onset of SVT and pt. Was given adenosine 18mg total and has converted to normal sinus rhythm. Denies chest pain, SOB, and pain at this time.

## 2023-12-12 NOTE — ED PROVIDER NOTES
conveys that all of his questions have been answered. I have also provided discharge instructions for him that include: educational information regarding their diagnosis and treatment, and list of reasons why they would want to return to the ED prior to their follow-up appointment, should his condition change. He has been provided with education for proper emergency department utilization. CLINICAL IMPRESSION:  1. Paroxysmal supraventricular tachycardia        PLAN:  D/C Home    DISCHARGE MEDICATIONS:  Current Discharge Medication List             Details   metoprolol tartrate (LOPRESSOR) 25 MG tablet Take 1 tablet by mouth 2 times daily  Qty: 60 tablet, Refills: 1             DISCONTINUED MEDICATIONS:  Current Discharge Medication List          PATIENT REFERRED TO:  Follow Up with:  Darryle Hugger, MD  [address] [phone]    Schedule an appointment as soon as possible for a visit   As soon as possible, For follow up from the Emergency Department    Familia Richmond, FELICITAS - ANNA  [facility]  [phone]    Schedule an appointment as soon as possible for a visit   With your PCP, As soon as possible, For follow up from the Emergency Department    [address] [place] [address] Drive  [phone]    As needed, If symptoms worsen      I Lewis Rhodes MD am the primary clinician of record. Dragon Disclaimer     Please note that this dictation was completed with [employer], the computer voice recognition software. Quite often unanticipated grammatical, syntax, homophones, and other interpretive errors are inadvertently transcribed by the computer software. Please disregard these errors. Please excuse any errors that have escaped final proofreading.     Lewis Rhodes MD  (Electronically signed)           Carin Thurman MD  12/12/23 1810

## 2023-12-12 NOTE — DISCHARGE INSTRUCTIONS
Follow-up with cardiology and with your primary care doctor. You will need to call to make an appointment. Return to the ED for worsening symptoms or for other concerns. Avoid caffeine and alcohol until cleared by cardiology.

## 2023-12-12 NOTE — ED NOTES
Paperwork read with patient making note of where to  prescriptions. IV taken out. Armband removed and disposed of in shredder. Patient has no further questions at this time. Will discharge from system.         Filiberto Hyman RN  12/12/23 [id number]